# Patient Record
Sex: FEMALE | Race: WHITE | Employment: OTHER | URBAN - METROPOLITAN AREA
[De-identification: names, ages, dates, MRNs, and addresses within clinical notes are randomized per-mention and may not be internally consistent; named-entity substitution may affect disease eponyms.]

---

## 2017-01-01 ENCOUNTER — APPOINTMENT (OUTPATIENT)
Dept: GENERAL RADIOLOGY | Facility: CLINIC | Age: 82
End: 2017-01-01
Attending: EMERGENCY MEDICINE
Payer: MEDICARE

## 2017-01-01 ENCOUNTER — MEDICAL CORRESPONDENCE (OUTPATIENT)
Dept: HEALTH INFORMATION MANAGEMENT | Facility: CLINIC | Age: 82
End: 2017-01-01

## 2017-01-01 ENCOUNTER — HOSPITAL ENCOUNTER (OUTPATIENT)
Facility: CLINIC | Age: 82
Setting detail: OBSERVATION
Discharge: HOME HEALTH | End: 2017-03-17
Attending: EMERGENCY MEDICINE | Admitting: INTERNAL MEDICINE
Payer: MEDICARE

## 2017-01-01 ENCOUNTER — TRANSFERRED RECORDS (OUTPATIENT)
Dept: HEALTH INFORMATION MANAGEMENT | Facility: CLINIC | Age: 82
End: 2017-01-01

## 2017-01-01 ENCOUNTER — TELEPHONE (OUTPATIENT)
Dept: FAMILY MEDICINE | Facility: CLINIC | Age: 82
End: 2017-01-01

## 2017-01-01 ENCOUNTER — DOCUMENTATION ONLY (OUTPATIENT)
Dept: CARE COORDINATION | Facility: CLINIC | Age: 82
End: 2017-01-01

## 2017-01-01 ENCOUNTER — APPOINTMENT (OUTPATIENT)
Dept: PHYSICAL THERAPY | Facility: CLINIC | Age: 82
End: 2017-01-01
Attending: INTERNAL MEDICINE
Payer: MEDICARE

## 2017-01-01 ENCOUNTER — CARE COORDINATION (OUTPATIENT)
Dept: CARE COORDINATION | Facility: CLINIC | Age: 82
End: 2017-01-01

## 2017-01-01 ENCOUNTER — APPOINTMENT (OUTPATIENT)
Dept: CT IMAGING | Facility: CLINIC | Age: 82
End: 2017-01-01
Attending: EMERGENCY MEDICINE
Payer: MEDICARE

## 2017-01-01 ENCOUNTER — HOSPITAL ENCOUNTER (OUTPATIENT)
Facility: CLINIC | Age: 82
Setting detail: OBSERVATION
Discharge: HOME | End: 2017-03-15
Attending: EMERGENCY MEDICINE | Admitting: INTERNAL MEDICINE
Payer: MEDICARE

## 2017-01-01 ENCOUNTER — OFFICE VISIT (OUTPATIENT)
Dept: FAMILY MEDICINE | Facility: CLINIC | Age: 82
End: 2017-01-01
Payer: COMMERCIAL

## 2017-01-01 VITALS
BODY MASS INDEX: 17.85 KG/M2 | DIASTOLIC BLOOD PRESSURE: 69 MMHG | HEIGHT: 62 IN | SYSTOLIC BLOOD PRESSURE: 141 MMHG | TEMPERATURE: 97.9 F | RESPIRATION RATE: 16 BRPM | OXYGEN SATURATION: 94 % | WEIGHT: 97 LBS

## 2017-01-01 VITALS
SYSTOLIC BLOOD PRESSURE: 169 MMHG | OXYGEN SATURATION: 94 % | DIASTOLIC BLOOD PRESSURE: 75 MMHG | HEART RATE: 77 BPM | TEMPERATURE: 97.2 F | RESPIRATION RATE: 16 BRPM

## 2017-01-01 VITALS
BODY MASS INDEX: 17.85 KG/M2 | WEIGHT: 97 LBS | SYSTOLIC BLOOD PRESSURE: 130 MMHG | HEIGHT: 62 IN | TEMPERATURE: 98.1 F | HEART RATE: 114 BPM | OXYGEN SATURATION: 95 % | DIASTOLIC BLOOD PRESSURE: 58 MMHG

## 2017-01-01 DIAGNOSIS — G89.4 CHRONIC PAIN SYNDROME: ICD-10-CM

## 2017-01-01 DIAGNOSIS — F51.01 PRIMARY INSOMNIA: ICD-10-CM
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
## 2017-01-01 DIAGNOSIS — R63.8 POOR FLUID INTAKE: ICD-10-CM

## 2017-01-01 DIAGNOSIS — S32.010A COMPRESSION FRACTURE OF L1 LUMBAR VERTEBRA, CLOSED, INITIAL ENCOUNTER (H): ICD-10-CM

## 2017-01-01 DIAGNOSIS — R11.0 NAUSEA: Primary | ICD-10-CM

## 2017-01-01 DIAGNOSIS — I48.91 ATRIAL FIBRILLATION, UNSPECIFIED TYPE (H): ICD-10-CM

## 2017-01-01 DIAGNOSIS — J44.9 CHRONIC OBSTRUCTIVE PULMONARY DISEASE, UNSPECIFIED COPD TYPE (H): ICD-10-CM

## 2017-01-01 DIAGNOSIS — I48.91 ATRIAL FIBRILLATION WITH RVR (H): ICD-10-CM

## 2017-01-01 DIAGNOSIS — S32.010A COMPRESSION FRACTURE OF L1 LUMBAR VERTEBRA, CLOSED, INITIAL ENCOUNTER (H): Primary | ICD-10-CM

## 2017-01-01 LAB
ALBUMIN UR-MCNC: 30 MG/DL
ALBUMIN UR-MCNC: NEGATIVE MG/DL
ANION GAP SERPL CALCULATED.3IONS-SCNC: 10 MMOL/L (ref 3–14)
ANION GAP SERPL CALCULATED.3IONS-SCNC: 13 MMOL/L (ref 3–14)
APPEARANCE UR: ABNORMAL
APPEARANCE UR: ABNORMAL
BACTERIA #/AREA URNS HPF: ABNORMAL /HPF
BASOPHILS # BLD AUTO: 0.1 10E9/L (ref 0–0.2)
BASOPHILS # BLD AUTO: 0.1 10E9/L (ref 0–0.2)
BASOPHILS NFR BLD AUTO: 0.5 %
BASOPHILS NFR BLD AUTO: 0.7 %
BILIRUB UR QL STRIP: NEGATIVE
BILIRUB UR QL STRIP: NEGATIVE
BUN SERPL-MCNC: 17 MG/DL (ref 7–30)
BUN SERPL-MCNC: 19 MG/DL (ref 7–30)
CALCIUM SERPL-MCNC: 8.6 MG/DL (ref 8.5–10.1)
CALCIUM SERPL-MCNC: 8.9 MG/DL (ref 8.5–10.1)
CHLORIDE SERPL-SCNC: 97 MMOL/L (ref 94–109)
CHLORIDE SERPL-SCNC: 98 MMOL/L (ref 94–109)
CO2 SERPL-SCNC: 25 MMOL/L (ref 20–32)
CO2 SERPL-SCNC: 26 MMOL/L (ref 20–32)
COLOR UR AUTO: YELLOW
COLOR UR AUTO: YELLOW
CREAT SERPL-MCNC: 0.69 MG/DL (ref 0.52–1.04)
CREAT SERPL-MCNC: 0.72 MG/DL (ref 0.52–1.04)
CRP SERPL-MCNC: 27.8 MG/L (ref 0–8)
DIFFERENTIAL METHOD BLD: ABNORMAL
DIFFERENTIAL METHOD BLD: ABNORMAL
EOSINOPHIL # BLD AUTO: 0.2 10E9/L (ref 0–0.7)
EOSINOPHIL # BLD AUTO: 0.2 10E9/L (ref 0–0.7)
EOSINOPHIL NFR BLD AUTO: 1.5 %
EOSINOPHIL NFR BLD AUTO: 1.6 %
ERYTHROCYTE [DISTWIDTH] IN BLOOD BY AUTOMATED COUNT: 12.2 % (ref 10–15)
ERYTHROCYTE [DISTWIDTH] IN BLOOD BY AUTOMATED COUNT: 12.4 % (ref 10–15)
GFR SERPL CREATININE-BSD FRML MDRD: 77 ML/MIN/1.7M2
GFR SERPL CREATININE-BSD FRML MDRD: 81 ML/MIN/1.7M2
GLUCOSE SERPL-MCNC: 104 MG/DL (ref 70–99)
GLUCOSE SERPL-MCNC: 110 MG/DL (ref 70–99)
GLUCOSE UR STRIP-MCNC: NEGATIVE MG/DL
GLUCOSE UR STRIP-MCNC: NEGATIVE MG/DL
HCT VFR BLD AUTO: 38.7 % (ref 35–47)
HCT VFR BLD AUTO: 39.2 % (ref 35–47)
HGB BLD-MCNC: 13.5 G/DL (ref 11.7–15.7)
HGB BLD-MCNC: 13.6 G/DL (ref 11.7–15.7)
HGB UR QL STRIP: NEGATIVE
HGB UR QL STRIP: NEGATIVE
HYALINE CASTS #/AREA URNS LPF: 4 /LPF (ref 0–2)
IMM GRANULOCYTES # BLD: 0.1 10E9/L (ref 0–0.4)
IMM GRANULOCYTES # BLD: 0.1 10E9/L (ref 0–0.4)
IMM GRANULOCYTES NFR BLD: 0.5 %
IMM GRANULOCYTES NFR BLD: 0.6 %
INTERPRETATION ECG - MUSE: NORMAL
KETONES UR STRIP-MCNC: 5 MG/DL
KETONES UR STRIP-MCNC: NEGATIVE MG/DL
LACTATE BLD-SCNC: 0.9 MMOL/L (ref 0.7–2.1)
LEUKOCYTE ESTERASE UR QL STRIP: NEGATIVE
LEUKOCYTE ESTERASE UR QL STRIP: NEGATIVE
LYMPHOCYTES # BLD AUTO: 1.9 10E9/L (ref 0.8–5.3)
LYMPHOCYTES # BLD AUTO: 2.2 10E9/L (ref 0.8–5.3)
LYMPHOCYTES NFR BLD AUTO: 16.1 %
LYMPHOCYTES NFR BLD AUTO: 17.1 %
MCH RBC QN AUTO: 32.2 PG (ref 26.5–33)
MCH RBC QN AUTO: 32.5 PG (ref 26.5–33)
MCHC RBC AUTO-ENTMCNC: 34.7 G/DL (ref 31.5–36.5)
MCHC RBC AUTO-ENTMCNC: 34.9 G/DL (ref 31.5–36.5)
MCV RBC AUTO: 93 FL (ref 78–100)
MCV RBC AUTO: 93 FL (ref 78–100)
MONOCYTES # BLD AUTO: 0.8 10E9/L (ref 0–1.3)
MONOCYTES # BLD AUTO: 1.1 10E9/L (ref 0–1.3)
MONOCYTES NFR BLD AUTO: 6.1 %
MONOCYTES NFR BLD AUTO: 9.2 %
MUCOUS THREADS #/AREA URNS LPF: PRESENT /LPF
MUCOUS THREADS #/AREA URNS LPF: PRESENT /LPF
NEUTROPHILS # BLD AUTO: 8.3 10E9/L (ref 1.6–8.3)
NEUTROPHILS # BLD AUTO: 9.7 10E9/L (ref 1.6–8.3)
NEUTROPHILS NFR BLD AUTO: 71.9 %
NEUTROPHILS NFR BLD AUTO: 74.2 %
NITRATE UR QL: NEGATIVE
NITRATE UR QL: NEGATIVE
NRBC # BLD AUTO: 0 10*3/UL
NRBC # BLD AUTO: 0 10*3/UL
NRBC BLD AUTO-RTO: 0 /100
NRBC BLD AUTO-RTO: 0 /100
PH UR STRIP: 5 PH (ref 5–7)
PH UR STRIP: 5 PH (ref 5–7)
PLATELET # BLD AUTO: 363 10E9/L (ref 150–450)
PLATELET # BLD AUTO: 411 10E9/L (ref 150–450)
POTASSIUM SERPL-SCNC: 3.6 MMOL/L (ref 3.4–5.3)
POTASSIUM SERPL-SCNC: 3.6 MMOL/L (ref 3.4–5.3)
RBC # BLD AUTO: 4.16 10E12/L (ref 3.8–5.2)
RBC # BLD AUTO: 4.22 10E12/L (ref 3.8–5.2)
RBC #/AREA URNS AUTO: 2 /HPF (ref 0–2)
RBC #/AREA URNS AUTO: <1 /HPF (ref 0–2)
SODIUM SERPL-SCNC: 134 MMOL/L (ref 133–144)
SODIUM SERPL-SCNC: 135 MMOL/L (ref 133–144)
SP GR UR STRIP: 1.01 (ref 1–1.03)
SP GR UR STRIP: 1.02 (ref 1–1.03)
SQUAMOUS #/AREA URNS AUTO: 1 /HPF (ref 0–1)
SQUAMOUS #/AREA URNS AUTO: 6 /HPF (ref 0–1)
TROPONIN I SERPL-MCNC: NORMAL UG/L (ref 0–0.04)
URN SPEC COLLECT METH UR: ABNORMAL
URN SPEC COLLECT METH UR: ABNORMAL
UROBILINOGEN UR STRIP-MCNC: 0 MG/DL (ref 0–2)
UROBILINOGEN UR STRIP-MCNC: 0 MG/DL (ref 0–2)
VIT B12 SERPL-MCNC: 1719 PG/ML (ref 193–986)
WBC # BLD AUTO: 11.6 10E9/L (ref 4–11)
WBC # BLD AUTO: 13.1 10E9/L (ref 4–11)
WBC #/AREA URNS AUTO: 1 /HPF (ref 0–2)
WBC #/AREA URNS AUTO: 1 /HPF (ref 0–2)

## 2017-01-01 PROCEDURE — 96374 THER/PROPH/DIAG INJ IV PUSH: CPT

## 2017-01-01 PROCEDURE — 71020 XR CHEST 2 VW: CPT

## 2017-01-01 PROCEDURE — 85025 COMPLETE CBC W/AUTO DIFF WBC: CPT | Performed by: EMERGENCY MEDICINE

## 2017-01-01 PROCEDURE — 81001 URINALYSIS AUTO W/SCOPE: CPT | Performed by: EMERGENCY MEDICINE

## 2017-01-01 PROCEDURE — 99236 HOSP IP/OBS SAME DATE HI 85: CPT | Performed by: INTERNAL MEDICINE

## 2017-01-01 PROCEDURE — 25000132 ZZH RX MED GY IP 250 OP 250 PS 637: Mod: GY | Performed by: INTERNAL MEDICINE

## 2017-01-01 PROCEDURE — 25000132 ZZH RX MED GY IP 250 OP 250 PS 637: Mod: GY | Performed by: EMERGENCY MEDICINE

## 2017-01-01 PROCEDURE — 80048 BASIC METABOLIC PNL TOTAL CA: CPT | Performed by: EMERGENCY MEDICINE

## 2017-01-01 PROCEDURE — A9270 NON-COVERED ITEM OR SERVICE: HCPCS | Mod: GY | Performed by: INTERNAL MEDICINE

## 2017-01-01 PROCEDURE — 84484 ASSAY OF TROPONIN QUANT: CPT | Performed by: EMERGENCY MEDICINE

## 2017-01-01 PROCEDURE — 93005 ELECTROCARDIOGRAM TRACING: CPT

## 2017-01-01 PROCEDURE — 40000193 ZZH STATISTIC PT WARD VISIT: Performed by: PHYSICAL THERAPIST

## 2017-01-01 PROCEDURE — 25000128 H RX IP 250 OP 636: Performed by: INTERNAL MEDICINE

## 2017-01-01 PROCEDURE — 72131 CT LUMBAR SPINE W/O DYE: CPT

## 2017-01-01 PROCEDURE — 99217 ZZC OBSERVATION CARE DISCHARGE: CPT | Performed by: PHYSICIAN ASSISTANT

## 2017-01-01 PROCEDURE — 25000132 ZZH RX MED GY IP 250 OP 250 PS 637: Performed by: PHYSICIAN ASSISTANT

## 2017-01-01 PROCEDURE — G0378 HOSPITAL OBSERVATION PER HR: HCPCS

## 2017-01-01 PROCEDURE — A9270 NON-COVERED ITEM OR SERVICE: HCPCS | Mod: GY | Performed by: EMERGENCY MEDICINE

## 2017-01-01 PROCEDURE — 97161 PT EVAL LOW COMPLEX 20 MIN: CPT | Mod: GP | Performed by: PHYSICAL THERAPIST

## 2017-01-01 PROCEDURE — 83605 ASSAY OF LACTIC ACID: CPT | Performed by: EMERGENCY MEDICINE

## 2017-01-01 PROCEDURE — 86140 C-REACTIVE PROTEIN: CPT | Performed by: EMERGENCY MEDICINE

## 2017-01-01 PROCEDURE — 25000128 H RX IP 250 OP 636: Performed by: EMERGENCY MEDICINE

## 2017-01-01 PROCEDURE — 99207 ZZC APP CREDIT; MD BILLING SHARED VISIT: CPT | Performed by: PHYSICIAN ASSISTANT

## 2017-01-01 PROCEDURE — 99222 1ST HOSP IP/OBS MODERATE 55: CPT | Performed by: NURSE PRACTITIONER

## 2017-01-01 PROCEDURE — 96375 TX/PRO/DX INJ NEW DRUG ADDON: CPT

## 2017-01-01 PROCEDURE — 25500064 ZZH RX 255 OP 636: Performed by: EMERGENCY MEDICINE

## 2017-01-01 PROCEDURE — 74177 CT ABD & PELVIS W/CONTRAST: CPT

## 2017-01-01 PROCEDURE — 99214 OFFICE O/P EST MOD 30 MIN: CPT | Performed by: FAMILY MEDICINE

## 2017-01-01 PROCEDURE — 70450 CT HEAD/BRAIN W/O DYE: CPT

## 2017-01-01 PROCEDURE — 96361 HYDRATE IV INFUSION ADD-ON: CPT

## 2017-01-01 PROCEDURE — 99285 EMERGENCY DEPT VISIT HI MDM: CPT | Mod: 25

## 2017-01-01 PROCEDURE — 36415 COLL VENOUS BLD VENIPUNCTURE: CPT | Performed by: PHYSICIAN ASSISTANT

## 2017-01-01 PROCEDURE — 96376 TX/PRO/DX INJ SAME DRUG ADON: CPT

## 2017-01-01 PROCEDURE — 99220 ZZC INITIAL OBSERVATION CARE,LEVL III: CPT | Performed by: INTERNAL MEDICINE

## 2017-01-01 PROCEDURE — 82607 VITAMIN B-12: CPT | Performed by: PHYSICIAN ASSISTANT

## 2017-01-01 PROCEDURE — G0180 MD CERTIFICATION HHA PATIENT: HCPCS | Mod: GW | Performed by: FAMILY MEDICINE

## 2017-01-01 RX ORDER — DOCUSATE SODIUM 100 MG/1
100 CAPSULE, LIQUID FILLED ORAL 2 TIMES DAILY
Qty: 30 CAPSULE | Refills: 0 | Status: SHIPPED | OUTPATIENT
Start: 2017-01-01

## 2017-01-01 RX ORDER — HYDROCODONE BITARTRATE AND ACETAMINOPHEN 5; 325 MG/1; MG/1
1-2 TABLET ORAL EVERY 6 HOURS PRN
Qty: 30 TABLET | Refills: 0 | Status: SHIPPED | OUTPATIENT
Start: 2017-01-01 | End: 2017-01-01

## 2017-01-01 RX ORDER — TRAMADOL HYDROCHLORIDE 50 MG/1
25 TABLET ORAL EVERY 6 HOURS PRN
Qty: 5 TABLET | Refills: 0 | Status: ON HOLD | OUTPATIENT
Start: 2017-01-01 | End: 2017-01-01

## 2017-01-01 RX ORDER — ASPIRIN 325 MG
325 TABLET ORAL DAILY
COMMUNITY

## 2017-01-01 RX ORDER — ACETAMINOPHEN 500 MG
1000 TABLET ORAL 3 TIMES DAILY
Status: DISCONTINUED | OUTPATIENT
Start: 2017-01-01 | End: 2017-01-01

## 2017-01-01 RX ORDER — NALOXONE HYDROCHLORIDE 0.4 MG/ML
.1-.4 INJECTION, SOLUTION INTRAMUSCULAR; INTRAVENOUS; SUBCUTANEOUS
Status: DISCONTINUED | OUTPATIENT
Start: 2017-01-01 | End: 2017-01-01 | Stop reason: HOSPADM

## 2017-01-01 RX ORDER — LIDOCAINE 40 MG/G
CREAM TOPICAL
Status: DISCONTINUED | OUTPATIENT
Start: 2017-01-01 | End: 2017-01-01

## 2017-01-01 RX ORDER — ACETAMINOPHEN 500 MG
1000 TABLET ORAL 3 TIMES DAILY
Qty: 1 BOTTLE | Refills: 0 | Status: ON HOLD | OUTPATIENT
Start: 2017-01-01 | End: 2017-01-01

## 2017-01-01 RX ORDER — ACETAMINOPHEN 500 MG
1000 TABLET ORAL 3 TIMES DAILY
Status: DISCONTINUED | OUTPATIENT
Start: 2017-01-01 | End: 2017-01-01 | Stop reason: HOSPADM

## 2017-01-01 RX ORDER — SODIUM CHLORIDE 9 MG/ML
1000 INJECTION, SOLUTION INTRAVENOUS CONTINUOUS
Status: DISCONTINUED | OUTPATIENT
Start: 2017-01-01 | End: 2017-01-01

## 2017-01-01 RX ORDER — ACETAMINOPHEN 500 MG
500 TABLET ORAL 3 TIMES DAILY
Status: ON HOLD | COMMUNITY
End: 2017-01-01

## 2017-01-01 RX ORDER — ACETAMINOPHEN 325 MG/1
650 TABLET ORAL ONCE
Status: COMPLETED | OUTPATIENT
Start: 2017-01-01 | End: 2017-01-01

## 2017-01-01 RX ORDER — ACETAMINOPHEN 500 MG
500-1000 TABLET ORAL EVERY 8 HOURS PRN
Status: ON HOLD | COMMUNITY
End: 2017-01-01

## 2017-01-01 RX ORDER — SENNOSIDES 8.6 MG
1 TABLET ORAL 2 TIMES DAILY
Qty: 30 TABLET | Refills: 1 | Status: SHIPPED | OUTPATIENT
Start: 2017-01-01

## 2017-01-01 RX ORDER — ONDANSETRON 4 MG/1
4 TABLET, ORALLY DISINTEGRATING ORAL EVERY 6 HOURS PRN
Qty: 20 TABLET | Refills: 0 | Status: SHIPPED | OUTPATIENT
Start: 2017-01-01

## 2017-01-01 RX ORDER — HYDROMORPHONE HCL/0.9% NACL/PF 0.2MG/0.2
0.2 SYRINGE (ML) INTRAVENOUS ONCE
Status: COMPLETED | OUTPATIENT
Start: 2017-01-01 | End: 2017-01-01

## 2017-01-01 RX ORDER — ALBUTEROL SULFATE 0.83 MG/ML
2.5 SOLUTION RESPIRATORY (INHALATION)
Status: DISCONTINUED | OUTPATIENT
Start: 2017-01-01 | End: 2017-01-01 | Stop reason: HOSPADM

## 2017-01-01 RX ORDER — DILTIAZEM HYDROCHLORIDE 180 MG/1
CAPSULE, EXTENDED RELEASE ORAL
Qty: 90 CAPSULE | Refills: 1 | Status: SHIPPED | OUTPATIENT
Start: 2017-01-01

## 2017-01-01 RX ORDER — PREDNISONE 20 MG/1
20 TABLET ORAL DAILY
Qty: 5 TABLET | Refills: 0 | Status: SHIPPED | OUTPATIENT
Start: 2017-01-01

## 2017-01-01 RX ORDER — MIRTAZAPINE 15 MG/1
15 TABLET, FILM COATED ORAL AT BEDTIME
Status: DISCONTINUED | OUTPATIENT
Start: 2017-01-01 | End: 2017-01-01 | Stop reason: HOSPADM

## 2017-01-01 RX ORDER — HYDROMORPHONE HCL/0.9% NACL/PF 0.2MG/0.2
0.2 SYRINGE (ML) INTRAVENOUS
Status: COMPLETED | OUTPATIENT
Start: 2017-01-01 | End: 2017-01-01

## 2017-01-01 RX ORDER — HYDROMORPHONE HCL/0.9% NACL/PF 0.2MG/0.2
0.2 SYRINGE (ML) INTRAVENOUS
Status: DISCONTINUED | OUTPATIENT
Start: 2017-01-01 | End: 2017-01-01 | Stop reason: CLARIF

## 2017-01-01 RX ORDER — ONDANSETRON 4 MG/1
4 TABLET, ORALLY DISINTEGRATING ORAL EVERY 6 HOURS PRN
Status: DISCONTINUED | OUTPATIENT
Start: 2017-01-01 | End: 2017-01-01 | Stop reason: HOSPADM

## 2017-01-01 RX ORDER — SODIUM CHLORIDE 9 MG/ML
1000 INJECTION, SOLUTION INTRAVENOUS CONTINUOUS
Status: DISCONTINUED | OUTPATIENT
Start: 2017-01-01 | End: 2017-01-01 | Stop reason: CLARIF

## 2017-01-01 RX ORDER — ACETAMINOPHEN 500 MG
500 TABLET ORAL 3 TIMES DAILY
Qty: 1 BOTTLE | Refills: 0 | Status: SHIPPED | OUTPATIENT
Start: 2017-01-01

## 2017-01-01 RX ORDER — DILTIAZEM HYDROCHLORIDE 180 MG/1
180 CAPSULE, COATED, EXTENDED RELEASE ORAL DAILY
Status: DISCONTINUED | OUTPATIENT
Start: 2017-01-01 | End: 2017-01-01 | Stop reason: HOSPADM

## 2017-01-01 RX ORDER — ONDANSETRON 2 MG/ML
4 INJECTION INTRAMUSCULAR; INTRAVENOUS EVERY 30 MIN PRN
Status: DISCONTINUED | OUTPATIENT
Start: 2017-01-01 | End: 2017-01-01

## 2017-01-01 RX ORDER — HYDROMORPHONE HYDROCHLORIDE 2 MG/1
2 TABLET ORAL EVERY 6 HOURS PRN
Qty: 20 TABLET | Refills: 0 | Status: ON HOLD | OUTPATIENT
Start: 2017-01-01 | End: 2017-01-01

## 2017-01-01 RX ORDER — DOCUSATE SODIUM 100 MG/1
100 CAPSULE, LIQUID FILLED ORAL 2 TIMES DAILY
Status: DISCONTINUED | OUTPATIENT
Start: 2017-01-01 | End: 2017-01-01 | Stop reason: HOSPADM

## 2017-01-01 RX ORDER — ALBUTEROL SULFATE 0.83 MG/ML
1 SOLUTION RESPIRATORY (INHALATION) EVERY 6 HOURS PRN
Status: DISCONTINUED | OUTPATIENT
Start: 2017-01-01 | End: 2017-01-01 | Stop reason: HOSPADM

## 2017-01-01 RX ORDER — OXYCODONE HYDROCHLORIDE 5 MG/1
2.5 TABLET ORAL EVERY 4 HOURS PRN
Qty: 20 TABLET | Refills: 0 | Status: SHIPPED | OUTPATIENT
Start: 2017-01-01

## 2017-01-01 RX ORDER — HYDROMORPHONE HYDROCHLORIDE 2 MG/1
2 TABLET ORAL EVERY 4 HOURS PRN
Status: DISCONTINUED | OUTPATIENT
Start: 2017-01-01 | End: 2017-01-01

## 2017-01-01 RX ORDER — DONEPEZIL HYDROCHLORIDE 5 MG/1
5 TABLET, FILM COATED ORAL AT BEDTIME
Status: DISCONTINUED | OUTPATIENT
Start: 2017-01-01 | End: 2017-01-01 | Stop reason: HOSPADM

## 2017-01-01 RX ORDER — ONDANSETRON 4 MG/1
4 TABLET, ORALLY DISINTEGRATING ORAL EVERY 6 HOURS PRN
Qty: 10 TABLET | Refills: 0 | Status: SHIPPED | OUTPATIENT
Start: 2017-01-01 | End: 2017-01-01

## 2017-01-01 RX ORDER — HYDROMORPHONE HCL/0.9% NACL/PF 0.2MG/0.2
0.2 SYRINGE (ML) INTRAVENOUS
Status: DISCONTINUED | OUTPATIENT
Start: 2017-01-01 | End: 2017-01-01

## 2017-01-01 RX ORDER — IOPAMIDOL 755 MG/ML
500 INJECTION, SOLUTION INTRAVASCULAR ONCE
Status: COMPLETED | OUTPATIENT
Start: 2017-01-01 | End: 2017-01-01

## 2017-01-01 RX ORDER — ACETAMINOPHEN 500 MG
500 TABLET ORAL 3 TIMES DAILY
Status: DISCONTINUED | OUTPATIENT
Start: 2017-01-01 | End: 2017-01-01 | Stop reason: HOSPADM

## 2017-01-01 RX ORDER — OXYCODONE HYDROCHLORIDE 5 MG/1
5 TABLET ORAL EVERY 4 HOURS PRN
Qty: 60 TABLET | Refills: 0 | Status: SHIPPED | OUTPATIENT
Start: 2017-01-01

## 2017-01-01 RX ORDER — HYDROCODONE BITARTRATE AND ACETAMINOPHEN 5; 325 MG/1; MG/1
1-2 TABLET ORAL EVERY 4 HOURS PRN
Status: DISCONTINUED | OUTPATIENT
Start: 2017-01-01 | End: 2017-01-01 | Stop reason: HOSPADM

## 2017-01-01 RX ORDER — ONDANSETRON 2 MG/ML
4 INJECTION INTRAMUSCULAR; INTRAVENOUS EVERY 6 HOURS PRN
Status: DISCONTINUED | OUTPATIENT
Start: 2017-01-01 | End: 2017-01-01 | Stop reason: HOSPADM

## 2017-01-01 RX ORDER — OXYCODONE HCL 10 MG/1
10 TABLET, FILM COATED, EXTENDED RELEASE ORAL EVERY 12 HOURS
Qty: 60 TABLET | Refills: 0 | Status: SHIPPED | OUTPATIENT
Start: 2017-01-01

## 2017-01-01 RX ORDER — TRAZODONE HYDROCHLORIDE 50 MG/1
50 TABLET, FILM COATED ORAL AT BEDTIME
Qty: 90 TABLET | Refills: 0 | Status: SHIPPED | OUTPATIENT
Start: 2017-01-01

## 2017-01-01 RX ORDER — MIRTAZAPINE 15 MG/1
TABLET, FILM COATED ORAL
Qty: 90 TABLET | Refills: 1 | Status: SHIPPED | OUTPATIENT
Start: 2017-01-01

## 2017-01-01 RX ORDER — MIRTAZAPINE 15 MG/1
15 TABLET, FILM COATED ORAL AT BEDTIME
Qty: 90 TABLET | Refills: 1 | Status: SHIPPED | OUTPATIENT
Start: 2017-01-01 | End: 2017-01-01

## 2017-01-01 RX ORDER — ACETAMINOPHEN 325 MG/1
650 TABLET ORAL EVERY 4 HOURS PRN
Status: DISCONTINUED | OUTPATIENT
Start: 2017-01-01 | End: 2017-01-01 | Stop reason: HOSPADM

## 2017-01-01 RX ORDER — HYDROCODONE BITARTRATE AND ACETAMINOPHEN 5; 325 MG/1; MG/1
1-2 TABLET ORAL EVERY 6 HOURS PRN
Qty: 30 TABLET | Refills: 0 | Status: SHIPPED | OUTPATIENT
Start: 2017-01-01

## 2017-01-01 RX ADMIN — HYDROMORPHONE HYDROCHLORIDE 0.2 MG: 10 INJECTION, SOLUTION INTRAMUSCULAR; INTRAVENOUS; SUBCUTANEOUS at 03:17

## 2017-01-01 RX ADMIN — HYDROCODONE BITARTRATE AND ACETAMINOPHEN 1 TABLET: 5; 325 TABLET ORAL at 03:54

## 2017-01-01 RX ADMIN — HYDROMORPHONE HYDROCHLORIDE 0.2 MG: 10 INJECTION, SOLUTION INTRAMUSCULAR; INTRAVENOUS; SUBCUTANEOUS at 00:25

## 2017-01-01 RX ADMIN — DOCUSATE SODIUM 100 MG: 100 CAPSULE, LIQUID FILLED ORAL at 08:19

## 2017-01-01 RX ADMIN — TRAMADOL HYDROCHLORIDE 25 MG: 50 TABLET ORAL at 13:41

## 2017-01-01 RX ADMIN — ACETAMINOPHEN 650 MG: 325 TABLET, FILM COATED ORAL at 23:14

## 2017-01-01 RX ADMIN — HYDROMORPHONE HYDROCHLORIDE 0.2 MG: 10 INJECTION, SOLUTION INTRAMUSCULAR; INTRAVENOUS; SUBCUTANEOUS at 06:06

## 2017-01-01 RX ADMIN — HYDROMORPHONE HYDROCHLORIDE 0.2 MG: 10 INJECTION, SOLUTION INTRAMUSCULAR; INTRAVENOUS; SUBCUTANEOUS at 20:59

## 2017-01-01 RX ADMIN — ASPIRIN 325 MG: 325 TABLET, DELAYED RELEASE ORAL at 12:48

## 2017-01-01 RX ADMIN — DONEPEZIL HYDROCHLORIDE 5 MG: 5 TABLET ORAL at 01:35

## 2017-01-01 RX ADMIN — SODIUM CHLORIDE 1000 ML: 9 INJECTION, SOLUTION INTRAVENOUS at 23:14

## 2017-01-01 RX ADMIN — MENTHOL 1 PATCH: 205.5 PATCH TOPICAL at 03:41

## 2017-01-01 RX ADMIN — ACETAMINOPHEN 1000 MG: 500 TABLET, FILM COATED ORAL at 08:09

## 2017-01-01 RX ADMIN — ONDANSETRON 4 MG: 2 INJECTION INTRAMUSCULAR; INTRAVENOUS at 20:59

## 2017-01-01 RX ADMIN — SODIUM CHLORIDE 52 ML: 9 INJECTION, SOLUTION INTRAVENOUS at 00:12

## 2017-01-01 RX ADMIN — IOPAMIDOL 50 ML: 755 INJECTION, SOLUTION INTRAVENOUS at 00:10

## 2017-01-01 RX ADMIN — DOCUSATE SODIUM 100 MG: 100 CAPSULE, LIQUID FILLED ORAL at 08:09

## 2017-01-01 RX ADMIN — DILTIAZEM HYDROCHLORIDE 180 MG: 180 CAPSULE, EXTENDED RELEASE ORAL at 12:48

## 2017-01-01 RX ADMIN — SODIUM CHLORIDE 1000 ML: 9 INJECTION, SOLUTION INTRAVENOUS at 20:59

## 2017-01-01 RX ADMIN — DILTIAZEM HYDROCHLORIDE 180 MG: 180 CAPSULE, EXTENDED RELEASE ORAL at 08:09

## 2017-01-01 RX ADMIN — ACETAMINOPHEN 1000 MG: 500 TABLET, FILM COATED ORAL at 08:19

## 2017-01-01 ASSESSMENT — PAIN DESCRIPTION - DESCRIPTORS
DESCRIPTORS: ACHING
DESCRIPTORS: ACHING;SHARP
DESCRIPTORS: ACHING

## 2017-01-01 ASSESSMENT — ENCOUNTER SYMPTOMS
ABDOMINAL PAIN: 1
ABDOMINAL PAIN: 1
FEVER: 0
BACK PAIN: 1
HEADACHES: 0
BACK PAIN: 1
WOUND: 1
FATIGUE: 1
CONSTIPATION: 0
FEVER: 0
NAUSEA: 0
DIARRHEA: 0
VOMITING: 1

## 2017-03-02 NOTE — TELEPHONE ENCOUNTER
Caller:  Matti  Relationship:  Son  Refill request received via: Analia  Patient requesting refill for:   Last office Visit: 05/14/2015  Last Refill: 12/23/2015    HYDROmorphone (DILAUDID) 2 MG tablet   2 mg, EVERY 6 HOURS PRN 0 ordered  EditCancel Reorder     Summary: Take 1 tablet (2 mg) by mouth every 6 hours as needed for pain, Disp-20 tablet, R-0, Local Print   Dose, Route, Frequency: 2 mg, Oral, EVERY 6 HOURS PRN  Start: 12/20/2016  Ord/Sold: 12/20/2016 (O)  Report  Taking:   Long-term:   Pharmacy: [id number] - [place], [place] - [address]  Med Dose History       Patient Sig: Take 1 tablet (2 mg) by mouth every 6 hours as needed for pain       Ordered on: 12/20/2016       Authorized by: WEILER, KAREN       Dispense: 20 tablet            Rubi Loyola  Patient Representative - [facility]

## 2017-03-02 NOTE — TELEPHONE ENCOUNTER
Controlled Substance Refill Request for Dilaudid  Problem List Complete:  No     PROVIDER TO CONSIDER COMPLETION OF PROBLEM LIST AND OVERVIEW/CONTROLLED SUBSTANCE AGREEMENT    Last Written Prescription Date:  12/20/16  Last Fill Quantity: 20,   # refills: 0    Last Office Visit with [facility] primary care provider: 9/22/16    Future Office visit:     Controlled substance agreement on file: No.     Processing:  Patient will  in clinic  RX [place] ([place]) reviewed:  reviewed- no concerns    [place]:  [email or web address]  Ginger Jackson RN, BSN  [facility]

## 2017-03-02 NOTE — TELEPHONE ENCOUNTER
Prescriptions done.  Please notify patient ready for . Placed in Keasha's box.      Karen Weiler, MD

## 2017-03-14 NOTE — IP AVS SNAPSHOT
[facility] Observation Department    [address]    [facility] [address]    Phone:  [phone]                                       After Visit Summary   3/14/2017    Analia Higgins    MRN: [id number]           After Visit Summary Signature Page     I have received my discharge instructions, and my questions have been answered. I have discussed any challenges I see with this plan with the nurse or doctor.    ..........................................................................................................................................  Patient/Patient Representative Signature      ..........................................................................................................................................  Patient Representative Print Name and Relationship to Patient    ..................................................               ................................................  Date                                            Time    ..........................................................................................................................................  Reviewed by Signature/Title    ...................................................              ..............................................  Date                                                            Time

## 2017-03-14 NOTE — IP AVS SNAPSHOT
MRN:[id number]                      After Visit Summary   3/14/2017    Analia Higgins    MRN: [id number]           Thank you!     Thank you for choosing [facility] for your care. Our goal is always to provide you with excellent care. Hearing back from our patients is one way we can continue to improve our services. Please take a few minutes to complete the written survey that you may receive in the mail after you visit. If you would like to speak to someone directly about your visit please contact Patient Relations at [phone]. Thank you!          Patient Information     Date Of Birth          5/26/1929        About your hospital stay     You were admitted on:  March 15, 2017 You last received care in the:  [facility] Observation Department    You were discharged on:  March 15, 2017        Reason for your hospital stay       You were admitted for concerns of fall. We scanned your head and everything looks good except a mild compression fracture of L1. You pain has improved with scheduled tylenol which you should take 3 times a day. Take a pain pill as needed prior to activity but DO NOT MIX with alcohol. Use your walker at all times.                  Who to Call     For medical emergencies, please call 911.  For non-urgent questions about your medical care, please call your primary care provider or clinic, [phone]          Attending Provider     Provider Specialty    Lb Torrez MD Emergency Medicine    [facility], MD [facility]       Primary Care Provider Office Phone # Fax #    Karen Weiler, [phone]       [facility] [address] [facility] [address]        After Care Instructions     Activity       Your activity upon discharge: activity as tolerated, use a walker at all times            Diet       Follow this diet upon discharge: Orders Placed This Encounter      Regular Diet Adult                   Follow-up Appointments     Follow-up and recommended labs and tests        Follow up with primary care provider, Karen Weiler, within 7 days for hospital follow- up.  No follow up labs or test are needed.                  Additional Services     Home care nursing referral       RN skilled nursing visit. RN to assess vital signs and weight, pain level and activity tolerance and home safety.    Your provider has ordered home care nursing services. If you have not been contacted within 2 days of your discharge please call the inpatient department phone number at [phone] .                             Pending Results     No orders found for last 3 day(s).            Statement of Approval     Ordered          03/15/17 1344  I have reviewed and agree with all the recommendations and orders detailed in this document.  EFFECTIVE NOW     Approved and electronically signed by:  Valery Braswell PA-C             Admission Information     Date & Time Department Dept. Phone    3/14/2017 [facility] Observation Department [phone]      Your Vitals Were     Blood Pressure Pulse Temperature Respirations Pulse Oximetry       169/75 (BP Location: Right arm) 77 97.2  F (36.2  C) (Oral) 16 94%       MyChart Information     [employer] [facility] [employer] gives you secure access to your electronic health record. If you see a primary care provider, you can also send messages to your care team and make appointments. If you have questions, please call your primary care clinic.  If you do not have a primary care provider, please call [phone] and they will assist you.        Care EveryWhere ID     This is your Care EveryWhere ID. This could be used by other organizations to access your [place] medical records  [id number]           Review of your medicines      START taking        Dose / Directions    docusate sodium 100 MG capsule   Commonly known as:  COLACE        Dose:  100 mg   Take 1 capsule (100 mg) by mouth 2 times daily    Quantity:  30 capsule   Refills:  0       menthol 5 % Pads   Commonly known as:  ICY HOT        Dose:  1 patch   Apply 1 patch topically every 8 hours as needed for muscle soreness   Quantity:  5 patch   Refills:  0       traMADol 50 MG tablet   Commonly known as:  ULTRAM        Dose:  25 mg   Take 0.5 tablets (25 mg) by mouth every 6 hours as needed for moderate to severe pain   Quantity:  5 tablet   Refills:  0         CONTINUE these medicines which may have CHANGED, or have new prescriptions. If we are uncertain of the size of tablets/capsules you have at home, strength may be listed as something that might have changed.        Dose / Directions    acetaminophen 500 MG tablet   Commonly known as:  TYLENOL   This may have changed:    - how much to take  - Another medication with the same name was removed. Continue taking this medication, and follow the directions you see here.        Dose:  1000 mg   Take 2 tablets (1,000 mg) by mouth 3 times daily   Quantity:  1 Bottle   Refills:  0         CONTINUE these medicines which have NOT CHANGED        Dose / Directions    albuterol (2.5 MG/3ML) 0.083% neb solution   Used for:  COPD (chronic obstructive pulmonary disease) (H)        Dose:  1 vial   Take 1 vial (2.5 mg) by nebulization every 6 hours as needed for shortness of breath / dyspnea or wheezing   Quantity:  75 mL   Refills:  0       aspirin 325 MG tablet        Dose:  325 mg   Take 325 mg by mouth daily   Refills:  0       CENTROVITE Tabs   Used for:  Alcohol abuse        Dose:  1 tablet   Take 1 tablet by mouth daily   Quantity:  90 tablet   Refills:  1       cyanocobalamin 1000 MCG/ML injection   Commonly known as:  VITAMIN B12        Dose:  1 mL   Inject 1 mL into the muscle every 30 days   Refills:  0       diltiazem 180 MG 24 hr capsule   Used for:  Atrial fibrillation with RVR (H)        Dose:  180 mg   Take 1 capsule (180 mg) by mouth daily Must be seen for further refills   Quantity:  90 capsule    Refills:  2       donepezil 5 MG tablet   Commonly known as:  ARICEPT   Used for:  Dementia        Dose:  5 mg   Take 1 tablet (5 mg) by mouth At Bedtime   Quantity:  90 tablet   Refills:  2       ENSURE Liqd   Used for:  Poor nutrition        2 cans of Ensure daily   Quantity:  60 Can   Refills:  11       ipratropium - albuterol 0.5 mg/2.5 mg/3 mL 0.5-2.5 (3) MG/3ML neb solution   Commonly known as:  DUONEB   Used for:  Chronic obstructive pulmonary disease, unspecified COPD type (H)        Dose:  3 mL   Take 1 vial (3 mLs) by nebulization 4 times daily as needed   Quantity:  1 Box   Refills:  4       mirtazapine 15 MG tablet   Commonly known as:  REMERON   Used for:  Primary insomnia        Dose:  15 mg   Take 1 tablet (15 mg) by mouth At Bedtime   Quantity:  90 tablet   Refills:  1       order for DME   Used for:  Fracture of humerus, proximal, right, open, Fall        Refills:  0       order for DME   Used for:  Chronic obstructive pulmonary disease, unspecified COPD type (H)        Equipment being ordered: Nebulizer   Quantity:  1 Device   Refills:  0       traZODone 50 MG tablet   Commonly known as:  DESYREL   Used for:  Chronic pain syndrome        Dose:  50 mg   Take 1 tablet (50 mg) by mouth At Bedtime   Quantity:  90 tablet   Refills:  0       VITAMIN D (CHOLECALCIFEROL) PO        Dose:  2000 Units   Take 2,000 Units by mouth daily.   Refills:  0         STOP taking     HYDROmorphone 2 MG tablet   Commonly known as:  DILAUDID                Where to get your medicines      These medications were sent to [facility] [address] [address] [address]     Phone:  [phone]     acetaminophen 500 MG tablet    docusate sodium 100 MG capsule    menthol 5 % Pads         Some of these will need a paper prescription and others can be bought over the counter. Ask your nurse if you have questions.     Bring a paper prescription for each of  these medications     traMADol 50 MG tablet                Protect others around you: Learn how to safely use, store and throw away your medicines at www.disposemymeds.org.             Medication List: This is a list of all your medications and when to take them. Check marks below indicate your daily home schedule. Keep this list as a reference.      Medications           Morning Afternoon Evening Bedtime As Needed    acetaminophen 500 MG tablet   Commonly known as:  TYLENOL   Take 2 tablets (1,000 mg) by mouth 3 times daily   Last time this was given:  1,000 mg on 3/15/2017  8:19 AM                                albuterol (2.5 MG/3ML) 0.083% neb solution   Take 1 vial (2.5 mg) by nebulization every 6 hours as needed for shortness of breath / dyspnea or wheezing                                aspirin 325 MG tablet   Take 325 mg by mouth daily                                CENTROVITE Tabs   Take 1 tablet by mouth daily                                cyanocobalamin 1000 MCG/ML injection   Commonly known as:  VITAMIN B12   Inject 1 mL into the muscle every 30 days                                diltiazem 180 MG 24 hr capsule   Take 1 capsule (180 mg) by mouth daily Must be seen for further refills   Last time this was given:  180 mg on 3/15/2017 12:48 PM                                docusate sodium 100 MG capsule   Commonly known as:  COLACE   Take 1 capsule (100 mg) by mouth 2 times daily   Last time this was given:  100 mg on 3/15/2017  8:19 AM                                donepezil 5 MG tablet   Commonly known as:  ARICEPT   Take 1 tablet (5 mg) by mouth At Bedtime                                ENSURE Liqd   2 cans of Ensure daily                                ipratropium - albuterol 0.5 mg/2.5 mg/3 mL 0.5-2.5 (3) MG/3ML neb solution   Commonly known as:  DUONEB   Take 1 vial (3 mLs) by nebulization 4 times daily as needed                                menthol 5 % Pads   Commonly known as:  ICY HOT   Apply  1 patch topically every 8 hours as needed for muscle soreness   Last time this was given:  1 patch on 3/15/2017  3:41 AM                                mirtazapine 15 MG tablet   Commonly known as:  REMERON   Take 1 tablet (15 mg) by mouth At Bedtime                                order for DME                                order for DME   Equipment being ordered: Nebulizer                                traMADol 50 MG tablet   Commonly known as:  ULTRAM   Take 0.5 tablets (25 mg) by mouth every 6 hours as needed for moderate to severe pain   Last time this was given:  25 mg on 3/15/2017  1:41 PM                                traZODone 50 MG tablet   Commonly known as:  DESYREL   Take 1 tablet (50 mg) by mouth At Bedtime                                VITAMIN D (CHOLECALCIFEROL) PO   Take 2,000 Units by mouth daily.

## 2017-03-15 PROBLEM — S32.010A COMPRESSION FRACTURE OF L1 LUMBAR VERTEBRA (H): Status: RESOLVED | Noted: 2017-01-01 | Resolved: 2017-01-01
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
## 2017-03-15 PROBLEM — S32.010A COMPRESSION FRACTURE OF L1 LUMBAR VERTEBRA (H): Status: ACTIVE | Noted: 2017-01-01

## 2017-03-15 PROBLEM — S32.010A COMPRESSION FRACTURE OF L1 LUMBAR VERTEBRA, CLOSED, INITIAL ENCOUNTER (H): Status: ACTIVE | Noted: 2017-01-01

## 2017-03-15 NOTE — PLAN OF CARE
[facility] has been notified of her new services that will be orders at discharge.    Home care RN to assess for PT and OT needs    Liz Adams RN BSN CTS  [facility]   Care Management Coordinator  [email or web address]   [phone]

## 2017-03-15 NOTE — PLAN OF CARE
Problem: Discharge Planning  Goal: Discharge Planning (Adult, OB, Behavioral, Peds)  Outcome: No Change  Patients bp elevated 179/84. Hospitalist notified. No new orders at this time.

## 2017-03-15 NOTE — PLAN OF CARE
Problem: Discharge Planning  Goal: Discharge Planning (Adult, OB, Behavioral, Peds)  Outcome: No Change  ROOM # 220     Living Situation (if not independent, order SW consult): lives with Son (POA)  Facility name:     Activity level at baseline: ind with walker  Activity level on admit:a-2 walker     Is patient a falls risk? Yes  Reason for falls risk:  Mobility  Falls armband on? YES,   Within Arm's Reach? Yes   Bed alarm turned on?   YES,   Personal alarm in place and turned on?   No, Not applicable     Patient registered to observation; given Patient Bill of Rights; given the opportunity to ask questions about observation status and their plan of care.  Patient has been oriented to the observation room, bathroom and call light is in place.     : Son

## 2017-03-15 NOTE — ED NOTES
Bed: ED13  Expected date: 3/14/17  Expected time: 9:16 PM  Means of arrival: Ambulance  Comments:  Lenny Carvalho

## 2017-03-15 NOTE — CONSULTS
[facility]    Neurosurgery Consultation     Date of Admission:  3/14/2017  Date of Consult (When I saw the patient): 03/15/17    Assessment & Plan   Analia Higgins is a 87 year old female who was admitted on 3/14/2017. I was asked to see the patient for L1 vertebral fracture post fall.  Pt is sitting up bed.  She denies any pain and is able to move without difficulty.  The pt was educated regarding her fracture.  She denies any pain and reports that she does not need a brace. The use of a brace for comfort and healing was explained.  However, she is up walking with the walker without difficulty. She can follow up as needed or if pain increases.  .    Active Problems:    Fall, initial encounter    Assessment: stable    Plan: no nsg intervention    FU as needed at [facility].       Compression fracture of L1 lumbar vertebra, closed, initial encounter (H)    Assessment: stable    Plan: as above      I have discussed the following assessment and plan with Dr. Cleveland Zhou  who is in agreement with initial plan and will follow up with further consultation recommendations.    Sadia Fernández [facility]  [facility]  [place] [address]    Tel [phone]  Pager [phone]        Code Status    Prior    Reason for Consult   Reason for consult: I was asked by Dr. Ha to evaluate this patient for L1 fracture.    Primary Care Physician   Karen Weiler    Chief Complaint   none    History is obtained from the patient    History of Present Illness   Analia Higgins is a 87 year old female who presents with L1 fracture post fall    Past Medical History   I have reviewed this patient's medical history and updated it with pertinent information if needed.   Past Medical History   Diagnosis Date     Acute alcoholic hepatitis      elevated LFTs due to ETOH ( -160's in past)     Alcoholic cirrhosis of liver (H) 7/04      early cirrhosis of liver; liver biopsy done during lap tomas     Closed fracture of unspecified part of humerus 2012     Arm fracture - 8 screws     CVA (cerebral infarction) 2012     CVA (cerebral vascular accident) (H)      Kidney stone      Normal delivery      normal vaginal deliveries x 5, no complications with any of the pregnancies or deliveries     Other abnormal blood chemistry      Other and unspecified alcohol dependence, continuous drinking behavior      Pneumonia, organism unspecified      Sciatica      Right sided     Tobacco use disorder      Unspecified disorder of lipoid metabolism        Past Surgical History   I have reviewed this patient's surgical history and updated it with pertinent information if needed.  Past Surgical History   Procedure Laterality Date     C vaginal hysterectomy       Hysterectomy, Vaginal, with cystocele repair     Hc lap,cholecystectomy/graph       Cataract iol, rt/lt       Bilateral - Dr Ronquillo     Open reduction internal fixation acetabulum  2011     left hip     Open reduction internal fixation humerus proximal  2012     Procedure:OPEN REDUCTION INTERNAL FIXATION HUMERUS PROXIMAL; OPEN REDUCTION INTERNAL FIXATION HUMERUS PROXIMAL ; Surgeon:JEN MCCRAY; Location:[facility] OR     Arthroplasty hip  2012     Procedure: ARTHROPLASTY HIP;  RIGHT HIP BIPOLAR (BRENT);  Surgeon: Alessio Finley MD;  Location:  OR       Prior to Admission Medications   Prior to Admission Medications   Prescriptions Last Dose Informant Patient Reported? Taking?   HYDROmorphone (DILAUDID) 2 MG tablet Past Week at Unknown time  No Yes   Sig: Take 1 tablet (2 mg) by mouth every 6 hours as needed for pain   Multiple Vitamins-Minerals (CENTROVITE) TABS 3/14/2017 at Unknown time  No Yes   Sig: Take 1 tablet by mouth daily   Nutritional Supplements (ENSURE) LIQD   No No   Si cans of Ensure daily   ORDER FOR DME   No No   VITAMIN D, CHOLECALCIFEROL, PO  3/14/2017 at Unknown time Son Yes Yes   Sig: Take 2,000 Units by mouth daily.     acetaminophen (TYLENOL) 500 MG tablet 3/14/2017 at Unknown time  Yes Yes   Sig: Take 500 mg by mouth 3 times daily   acetaminophen (TYLENOL) 500 MG tablet Past Month at Unknown time  Yes Yes   Sig: Take 500-1,000 mg by mouth every 8 hours as needed for mild pain or fever   albuterol (2.5 MG/3ML) 0.083% nebulizer solution Past Month at Unknown time  No Yes   Sig: Take 1 vial (2.5 mg) by nebulization every 6 hours as needed for shortness of breath / dyspnea or wheezing   aspirin 325 MG tablet 3/14/2017 at Unknown time  Yes Yes   Sig: Take 325 mg by mouth daily   cyanocobalamin (VITAMIN B12) 1000 MCG/ML injection More than a month at Unknown time  Yes No   Sig: Inject 1 mL into the muscle every 30 days   diltiazem 180 MG 24 hr CD capsule 3/14/2017 at Unknown time  No Yes   Sig: Take 1 capsule (180 mg) by mouth daily Must be seen for further refills   donepezil (ARICEPT) 5 MG tablet 3/14/2017 at Unknown time  No Yes   Sig: Take 1 tablet (5 mg) by mouth At Bedtime   ipratropium - albuterol 0.5 mg/2.5 mg/3 mL (DUONEB) 0.5-2.5 (3) MG/3ML nebulization Past Month at Unknown time  No Yes   Sig: Take 1 vial (3 mLs) by nebulization 4 times daily as needed   mirtazapine (REMERON) 15 MG tablet 3/14/2017 at Unknown time  No Yes   Sig: Take 1 tablet (15 mg) by mouth At Bedtime   order for DME   No No   Sig: Equipment being ordered: Nebulizer   traZODone (DESYREL) 50 MG tablet 3/14/2017 at Unknown time  No Yes   Sig: Take 1 tablet (50 mg) by mouth At Bedtime      Facility-Administered Medications: None     Allergies   Allergies   Allergen Reactions     Codeine      vomiting     Peanuts [Nuts]        Social History   I have reviewed this patient's social history and updated it with pertinent information if needed. Analia TORRES Ronaldo  reports that she has been smoking Cigarettes.  She has a 50.00 pack-year smoking history. She has never used smokeless  tobacco. She reports that she drinks about 21.0 oz of alcohol per week  She reports that she does not use illicit drugs.    Family History   I have reviewed this patient's family history and updated it with pertinent information if needed.   Family History   Problem Relation Age of Onset     C.A.D. Father      late 60's     Breast Cancer Sister      Asthma Son      DIABETES Maternal Aunt      DIABETES Maternal Aunt        Review of Systems   C: NEGATIVE for fever, chills, change in weight  I: NEGATIVE for worrisome rashes, moles or lesions  E: NEGATIVE for vision changes or irritation  E/M: NEGATIVE for ear, mouth and throat problems  R: NEGATIVE for significant cough or SOB  B: NEGATIVE for masses, tenderness or discharge  CV: NEGATIVE for chest pain, palpitations or peripheral edema  GI: NEGATIVE for nausea, abdominal pain, heartburn, or change in bowel habits  : NEGATIVE for frequency, dysuria, or hematuria  M: NEGATIVE for significant arthralgias or myalgia  N: post fall  E: NEGATIVE for temperature intolerance, skin/hair changes  H: NEGATIVE for bleeding problems  P: NEGATIVE for changes in mood or affect    Physical Exam   Temp: 97.2  F (36.2  C) Temp src: Oral BP: 187/88 Pulse: 87 Heart Rate: 96 Resp: 16 SpO2: 94 % O2 Device: None (Room air)    Vital Signs with Ranges  Temp:  [97.2  F (36.2  C)-98.3  F (36.8  C)] 97.2  F (36.2  C)  Pulse:  [83-87] 87  Heart Rate:  [73-96] 96  Resp:  [16] 16  BP: (157-187)/(74-88) 187/88  SpO2:  [91 %-97 %] 94 %  0 lbs 0 oz    Heart Rate: 96, Blood pressure 187/88, pulse 87, temperature 97.2  F (36.2  C), temperature source Oral, resp. rate 16, SpO2 94 %, not currently breastfeeding.  0 lbs 0 oz  HEENT:  Normocephalic, atraumatic.  PERRLA.  EOM s intact.   Neck:  Supple, non-tender, without lymphadenopathy.  Heart:  No peripheral edema  Lungs:  No SOB  Abdomen:  Soft, non-tender, non-distended.  Normal bowel sounds.  Skin:  Warm and dry, good capillary refill.  Extremities:   Good radial and dorsalis pedis pulses bilaterally, no edema, cyanosis or clubbing.    NEUROLOGICAL EXAMINATION:     Mental status:  Alert and Oriented x 3, speech is fluent.  Cranial nerves:  II-XII intact.   Motor:  Strength is 5/5 throughout the upper and lower extremities  Shoulder Abduction:  Right:  5/5   Left:  5/5  Biceps:                      Right:  5/5   Left:  5/5  Triceps:                     Right:  5/5   Left:  5/5  Wrist Extensors:       Right:  5/5   Left:  5/5  Wrist Flexors:           Right:  5/5   Left:  5/5  interosseus :            Right:  5/5   Left:  5/5   Hip Flexor:                Right: 5/5  Left:  5/5  Hip Adductor:             Right:  5/5  Left:  5/5  Hip Abductor:             Right:  5/5  Left:  5/5  Gastroc Soleus:        Right:  5/5  Left:  5/5  Tib/Ant:                      Right:  5/5  Left:  5/5  EHL:                     Right:  5/5  Left:  5/5  Sensation:  intact  Reflexes:   Negative Babinski.  Negative Clonus.      Gait:  Deferred    Lumbar examination reveals no tenderness of the spine or paraspinous muscles.  Hip height is symmetrical. Negative SI joint, sciatic notch or greater trochanteric tenderness to palpation bilaterally.  Straight leg raise is negative bilaterally.       Data   All new lab and imaging data was personally reviewed by me.  CT:IMPRESSION:   1. Acute mild compression fracture of the superior endplate of the L1  vertebral body.  2. No other visualized acute fractures of the lumbar spine.  3. Straightening of the normal lumbar lordosis, otherwise normal  alignment of the lumbar spine.  4. Moderate to severe degenerative changes throughout the lumbar  spine.  5. Partial visualization of a 3.5 cm abdominal aortic aneurysm.  6. A few nonobstructing bilateral renal calculi.      CBC RESULTS:   Recent Labs   Lab Test  03/14/17   2200   WBC  13.1*   RBC  4.16   HGB  13.5   HCT  38.7   MCV  93   MCH  32.5   MCHC  34.9   RDW  12.2   PLT  411     Basic Metabolic  Panel:  Lab Results   Component Value Date     03/14/2017      Lab Results   Component Value Date    POTASSIUM 3.6 03/14/2017     Lab Results   Component Value Date    CHLORIDE 97 03/14/2017     Lab Results   Component Value Date    STEPHANIE 8.6 03/14/2017     Lab Results   Component Value Date    CO2 25 03/14/2017     Lab Results   Component Value Date    BUN 19 03/14/2017     Lab Results   Component Value Date    CR 0.72 03/14/2017     Lab Results   Component Value Date     03/14/2017     INR:  Lab Results   Component Value Date    INR 0.88 05/12/2015    INR 0.94 03/02/2015    INR 0.95 10/08/2014    INR 0.93 10/02/2012    INR 1.10 08/30/2012    INR 1.05 08/29/2012    INR 1.02 08/28/2012    INR 0.85 08/26/2012    INR 0.94 01/09/2012    INR 0.98 12/13/2011    INR 0.96 04/07/2011    INR 1.02 07/08/2004

## 2017-03-15 NOTE — PLAN OF CARE
Problem: Goal Outcome Summary  Goal: Goal Outcome Summary  PT: Orders received. PT evaluation completed. Pt lives in house with son with no steps to enter. Pt utilizes 4WW for ambulation, but reports occasional noncompliance with use. Today, pt able to complete bed mobility indep. Pt ambulates with 4WW x 100' with SBA/Indep. Pt reports minimal pain with mobility. Pt educated on importance of utilizing walker at all times for ambulation. Rec pt discharge home. Nursing updated. IPPT will sign off at this time.

## 2017-03-15 NOTE — ED PROVIDER NOTES
History     Chief Complaint:  Fall      HPI   Analia Higgins is a 87 year old female who presents after a fall. The patient states that she suffered a fall around 1700 today. She is unsure if she fainted beforehand, but states that she may have. She does not remember hitting her head, but she has new abrasions on her right cheek; she does not have a headache. The patient is complaining most about lower abdominal pain that radiates straight through to her low back. She states that this pain occurred after she fell. She denies headache, fever, visual disturbance, neck pain, or recent bowel issues. The patient comments that she has felt more tired than usual recently.     The patient's son was in the emergency department and he states that the patient lives with him. He leaves for work at 0700 and returns at 1930. His daughter (the patient's granddaughter) looks after the patient throughout the day. The patient's granddaughter did not witness the fall today or hear a thump. The patient's son returned home at 1930 and noticed that a table seemed to be out of order, evident of a potential fall. He states that he is considering different living arrangements for his mother, as it is becoming difficult to care for her.      Allergies:  Codeine      Medications:    Hydromorphone (dilaudid) 2 mg tablet  Donepezil (aricept) 5 mg tablet  Trazodone (desyrel) 50 mg tablet  Azithromycin (zithromax) 250 mg tablet  Ipratropium - albuterol 0.5 mg/2.5 mg/3 ml (duoneb) 0.5-2.5 (3) mg/3ml nebulization  Diltiazem 180 mg 24 hr cd capsule  Mirtazapine (remeron) 15 mg tablet  Cyanocobalamin (vitamin b12) 1000 mcg/ml injection  Cyanocobalamin (vitamin b12) 1000 mcg/ml injection  Albuterol (2.5 mg/3ml) 0.083% nebulizer solution  Aspirin 81 mg ec tablet  Acetaminophen (tylenol) 500 mg tablet  Cyanocobalamin (vitamin b12) 1000 mcg/ml injection  Multiple vitamins-minerals (centrovite) tabs  Nutritional supplements (ensure) liqd  Vitamin  d, cholecalciferol, po    Past Medical History:    Acute alcoholic hepatitis  Alcoholic cirrhosis of liver (H)  Closed fracture of unspecified part of humerus  CVA (cerebral infarction)  CVA (cerebral vascular accident) (H)  Kidney stone  Normal delivery  Pneumonia, organism unspecified  Sciatica  Tobacco use disorder  Unspecified disorder of lipoid metabolism  Need for prophylactic vaccination with tetanus-diphtheria (TD)  Atrial fibrillation with RVR (H)  Acute gastroenteritis  New onset atrial fibrillation (H)  Carotid stenosis  COPD (chronic obstructive pulmonary disease) (H)  Open fracture of part of upper end of humerus  Fracture of left hip (H)    Past Surgical History:    Vaginal hysterectomy  Lap,cholecystectomy/graph  Cataract iol, rt/lt  Open reduction internal fixation acetabulum left hip  Open reduction internal fixation humerus proximal  Right hip arthroplasty    Family History:    CAD - Father   Breast cancer - Sister   Asthma - Son     Social History:  Marital Status:   Presents to the ED with her son  Tobacco Use: Current everyday smoker, 1 ppd for 50 years   Alcohol Use: Yes  PCP: Karen Weiler      Review of Systems   Constitutional: Positive for fatigue. Negative for fever.   Eyes: Negative for visual disturbance.   Gastrointestinal: Positive for abdominal pain. Negative for constipation and diarrhea.   Musculoskeletal: Positive for back pain.   Skin: Positive for wound (abrasion to cheek and forhead).   Neurological: Positive for syncope (possible). Negative for headaches.   All other systems reviewed and are negative.        Physical Exam   First Vitals:  BP: 157/74  Pulse: 83  Heart Rate: 83  Temp: 98.3  F (36.8  C)  Resp: 16  SpO2: 97 %    Physical Exam   HENT:   Right Ear: External ear normal.   Left Ear: External ear normal.   Nose: Nose normal.   Superficial abrasion R cheek   Eyes: Conjunctivae and lids are normal.   Neck: Normal range of motion. Neck supple. No tracheal deviation  present.   No midline ttp   Cardiovascular: Regular rhythm and intact distal pulses.    Pulmonary/Chest: Breath sounds normal. No respiratory distress.   Abdominal: Soft. There is no tenderness. There is no rebound and no guarding.   Musculoskeletal:   + ttp mid Lumbar spine, no step off, remainder skeletal survey normal    BLE - pf/df 5/5, SILT, dp/pt 2/4    BUE -  5/5, SILT. Radial 2/4   Neurological: She is alert.   Poor historian but answers simple questions, MAEE, no gross focal motor or sensory deficit   Skin: Skin is warm and dry. She is not diaphoretic.   Psychiatric: She has a normal mood and affect.   Nursing note and vitals reviewed.        Emergency Department Course   ECG:  @ 2155  Indication: fall  Vent. Rate 80 bpm. ID interval 178 ms. QRS duration 126 ms. QT/QTc 410/472 ms. P-R-T axis 77 -67 21.   Normal sinus rhythm. Right bundle branch block. Left anterior fascicular block. Bifascicular block.  Abnormal ECG.  No significant change when compared to previous ECG from 5/12/15   Read @ 2202 by Dr. Torrez.       Imaging:  X-ray Chest, 2 views:    1. No convincing evidence of active cardiopulmonary disease.  2. Hyperinflated lungs, suggestive of chronic obstructive pulmonary  disease.  Preliminary radiology read.        Lumbar spine CT without contrast:  1. Acute mild compression fracture of the superior endplate of the L1  vertebral body.  2. No other visualized acute fractures of the lumbar spine.  3. Straightening of the normal lumbar lordosis, otherwise normal  alignment of the lumbar spine.  4. Moderate to severe degenerative changes throughout the lumbar  spine.  5. Partial visualization of a 3.5 cm abdominal aortic aneurysm.  6. A few nonobstructing bilateral renal calculi.  Preliminary radiology read.        Abd/Pelvis CT, IV contrast only, TRAUMA AAA:  1. Acute mild compression fracture of the superior endplate of the L1  vertebral body.  2. No other cause of acute pain identified in the  abdomen or pelvis.  3. 3.6 x 3.5 cm abdominal aortic aneurysm, increased in caliber since  5/22/2009 when it measured 2.4 x 2.3 cm.  4. 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 There are a few nonobstructing calculi in both kidneys.  5. Colonic diverticulosis without evidence of diverticulitis  Preliminary radiology read.       Head CT without contrast:    No evidence of acute intracranial abnormality.  Preliminary radiology read.           Radiographic findings were communicated with the patient and family who voiced understanding of the findings.      Laboratory:  UA: Slightly Cloudy yellow urine, mucous present, Hyaline casts otherwise WNL     CBC:  WBC 13.1 (H), HGB 13.5,     BMP: Glucose 104 (H), otherwise WNL (Creatinine 0.72)    (2200) Troponin I: <0.015     Interventions:  (2314) Normal Saline, 1 liter, IV bolus    (2314) Tylenol, 650 mg, PO   (0025) Dilaudid, 0.2 mg, IV     Emergency Department Course:  Nursing notes and vitals reviewed.  I performed an exam of the patient as documented above.   EKG was done, interpretation as above.  IV inserted.   Blood was drawn from the patient. This was sent for laboratory testing, findings above.   The patient was sent for a chest x-ray, lumbar spine CT, abd/pelvis CT, and head CT while in the emergency department, findings above.    Findings and plan explained to the patient and her son who consent to admission.   (3177) I discussed the patient with Dr. Ha of the hospitalist service, who will admit the patient to Observation in a med tele bed for further monitoring, evaluation, and treatment.       Impression & Plan       Medical Decision Making:  Patient is a 87 year old female who lives at home with her son who had an unwitnessed fall today. She states that she has been more tired than usual. We will undertake a traumatic workup. She is neurologically intact, has a scrape on the right cheek and is complaining of some lower  back pain. We will do a CT scan of the head and abdomen, and a CT of the c-spine, x-ray of the chest, medical workup: basic blood tests, EKG, cardiac monitoring, intravenous fluids.     The patient remained stable here in the emergency department. Urinalysis negative, basic blood test showing a mild leukocytosis, otherwise unremarkable. EKG, troponin unremarkable. Imaging most remarkable for L1 compression fracture that appears to be acute. She does have an incidental note of an aortic aneurysm of 3.5 cm, this can be followed as an outpatient, likely would not be a good surgical candidate and is not of significant size where she would be considered for this.       We will admit to observation here for pain control and immobilization. She may need transitional care. She was made aware of chronic abnormalities on the CT scan in the abdomen.     Diagnosis:    ICD-10-CM    1. Fall, initial encounter W19.XXXA    2. Compression fracture of L1 lumbar vertebra, closed, initial encounter (H) S32.010A        Disposition:  Admit to Observation.     IJorge, am serving as a scribe on 3/14/2017 at 11:21 PM to personally document services performed by Dr. Torrez based on my observations and the provider's statements to me.     3/14/2017   [facility] EMERGENCY DEPARTMENT       Lb Torrez MD  03/15/17 0568

## 2017-03-15 NOTE — H&P
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  Only initial complaints was some lower abdominal pain and back pain.  Imaging studies including a CT of the abdomen and pelvis, head CT, and CT of the L-spine were only significant for an acute L1 appearing superior endplate mild compression fracture.    Problem list  1. Fall with clinical findings only significant for a mild L1 superior endplate compression fracture: This appears to be acute and secondary to her fall.  It's not entirely clear whether or not she had a syncopal event as this was witnessed but has not had any significant syncopal events in the past.  EKG is otherwise unremarkable.  Currently, she has no focal neurologic deficits.  She is able to sit up and rolled over in bed with mild assistance at this time.  2. History of alcohol cirrhosis: Patient does not appear to be intoxicated and denies any recent alcohol ingestion  3. Hypertension  4. Mild dementia: Currently pleasant with no evidence of acute delirium  5. History of CVA: Clinically does not have any focal neurologic findings  6. History of COPD: No evidence of any acute exacerbation    Plan:    Observation admission for pain control and physical therapy assessment for appropriate disposition    Do not suspect that patient will require surgical intervention but will order neurosurgical consultation per trauma protocol as this fracture appears acute.  Question if she would benefit from either a brace or an elastic corset    Schedule Tylenol 1 g p.o. t.i.d. with p.r.n. p.o.  Dilaudid.  She does have this listed as a prior to admission medication which she tolerates on a p.r.n. basis.  Additionally, offer mentholated patches p.r.n.    May resume her chronic diltiazem    Delirium precautions.  Resume Aricept and Remeron    Although doubt that she had a true syncopal event, we'll monitor her on telemetry.    -Additional workup plan which will include neurosurgical consultation    All lab work and imaging data independently reviewed by myself    Code status: At this time, patient voices possibility of being DNR/DNI.  In reviewing prior code status, her last code status was DNR/DNI as placed by her primary care physician but patient's code status while inpatient has been variable.  At this time, I am not confident that the patient has that ability to make this decision.  Decision may need to be addressed with the patient's son.  I do not see a polst chart.    Prophylaxis;  Ambulation.   Discharge: tbd.   to assist with discharge planning as son has voiced wanting to place patient in a different level of care.  Also will depend on physical therapy assessment.    Chief Complaint:   Fall/back pain   HPI  Patient is a pleasant 87-year-old female who presents here after being found down by her son after a presumed fall.  This was unwitnessed and occurred around 1700 p.m. yesterday.  Past medical history is significant for early cirrhosis of the liver secondary to alcohol, previous CVA with no significant neural deficits, copd, sciatica, and tobaccoism.  She reports that she felt a bit faint before the fall.  She does not remember hitting her head but had some notable superficial abrasions on her right cheek.  She complained more of lower abdominal pain as well as back pain.  She denied pain prior to the fall.  Per the report of the son to the emergency room staff, he states that the patient lives with him.  He generally use for work at 0700 and returns back at 1930 p.m.  The  patient's granddaughter generally stays with the patient throughout the day but did not witness the fall nor hear a thump  When the son returned home at around 1930, he noticed that the table seemed to be a bit out of order with some evidence of a possible fall.  He found her and brought her and brought her in for further evaluation.  He does voice some concerns about being unable to care for her longer and is considering looking for different living arrangements for the patient.  I did discuss the case in detail with the ED physician.     Past Medical History:     Past Medical History   Diagnosis Date     Acute alcoholic hepatitis      elevated LFTs due to ETOH ( -160's in past)     Alcoholic cirrhosis of liver (H) 7/04     early cirrhosis of liver; liver biopsy done during lap tomas     Closed fracture of unspecified part of humerus 1/2012     Arm fracture - 8 screws     CVA (cerebral infarction) 2/13/2012     CVA (cerebral vascular accident) (H)      Kidney stone      Normal delivery      normal vaginal deliveries x 5, no complications with any of the pregnancies or deliveries     Other abnormal blood chemistry 2000     Other and unspecified alcohol dependence, continuous drinking behavior 2000     Pneumonia, organism unspecified      Sciatica      Right sided     Tobacco use disorder      Unspecified disorder of lipoid metabolism      Past Surgical History:     Past Surgical History   Procedure Laterality Date     C vaginal hysterectomy  1971     Hysterectomy, Vaginal, with cystocele repair     Hc lap,cholecystectomy/graph  7/04     Cataract iol, rt/lt  8/06     Bilateral - Dr Ronquillo     Open reduction internal fixation acetabulum  april 2011     left hip     Open reduction internal fixation humerus proximal  1/9/2012     Procedure:OPEN REDUCTION INTERNAL FIXATION HUMERUS PROXIMAL; OPEN REDUCTION INTERNAL FIXATION HUMERUS PROXIMAL ; Surgeon:JEN MCCRAY; Location:[facility] OR     Arthroplasty hip  8/27/2012      Procedure: ARTHROPLASTY HIP;  RIGHT HIP BIPOLAR (BRENT);  Surgeon: Alessio Finley MD;  Location:  OR     Social History:     Social History   Substance Use Topics     Smoking status: Current Every Day Smoker     Packs/day: 1.00     Years: 50.00     Types: Cigarettes     Smokeless tobacco: Never Used      Comment: STARTED SMOKING IN 1947     Alcohol use 21.0 oz/week      Comment: 3 or 4 drinks of Vodka q day for  60 years - as of 05/062013 about 2 ozs of 80 vodka daily (son measures)     Family History:  Family history reviewed. NO pertinent family history     Allergies:     Allergies   Allergen Reactions     Codeine      vomiting     Peanuts [Nuts]      Medications:     Prescriptions Prior to Admission   Medication Sig Dispense Refill Last Dose     HYDROmorphone (DILAUDID) 2 MG tablet Take 1 tablet (2 mg) by mouth every 6 hours as needed for pain 20 tablet 0      donepezil (ARICEPT) 5 MG tablet Take 1 tablet (5 mg) by mouth At Bedtime 90 tablet 2      traZODone (DESYREL) 50 MG tablet Take 1 tablet (50 mg) by mouth At Bedtime 90 tablet 0      order for DME Equipment being ordered: Nebulizer 1 Device 0      azithromycin (ZITHROMAX) 250 MG tablet Two tablets first day, then one tablet daily for four days. 6 tablet 0      ipratropium - albuterol 0.5 mg/2.5 mg/3 mL (DUONEB) 0.5-2.5 (3) MG/3ML nebulization Take 1 vial (3 mLs) by nebulization 4 times daily as needed 1 Box 4      diltiazem 180 MG 24 hr CD capsule Take 1 capsule (180 mg) by mouth daily Must be seen for further refills 90 capsule 2      mirtazapine (REMERON) 15 MG tablet Take 1 tablet (15 mg) by mouth At Bedtime 90 tablet 1      cyanocobalamin (VITAMIN B12) 1000 MCG/ML injection Inject 1 mL (1,000 mcg) into the muscle every 30 days 1 mL 11      cyanocobalamin (VITAMIN B12) 1000 MCG/ML injection Inject 1 mL (1,000 mcg) into the muscle every 30 days 1 mL 0      albuterol (2.5 MG/3ML) 0.083% nebulizer solution Take 1 vial (2.5 mg) by nebulization every 6  hours as needed for shortness of breath / dyspnea or wheezing 75 mL 0 Taking     aspirin 81 MG EC tablet Take 4 tablets (325 mg) by mouth daily 30 tablet 11 Taking     acetaminophen (TYLENOL) 500 MG tablet Take 1 tablet (500 mg) by mouth 3 times daily 100 tablet 0 Taking     cyanocobalamin (VITAMIN B12) 1000 MCG/ML injection Inject 1 mL into the muscle every 30 days   Taking     Multiple Vitamins-Minerals (CENTROVITE) TABS Take 1 tablet by mouth daily 90 tablet 1 Taking     Nutritional Supplements (ENSURE) LIQD 2 cans of Ensure daily 60 Can 11 Taking     VITAMIN D, CHOLECALCIFEROL, PO Take 2,000 Units by mouth daily.     Taking     ORDER FOR DME    Taking       Review of Systems:   A Comprehensive greater than 10 system review of systems was carried out.  Pertinent positives and negatives are noted above.  Otherwise negative for contributory information.        Physical Exam:  Blood pressure 179/84, pulse 87, temperature 97.9  F (36.6  C), temperature source Oral, resp. rate 16, SpO2 93 %, not currently breastfeeding.  Gen: Pleasant in no acute distress.  HEENT: NCAT. EOMI. PERRL.  Neck: Normal inspection. No bruit, JVD or thyromegaly.  Lungs: Normal respiratory effort.Clear to auscultation bilaterally with no crackles or wheezes.  Card: N s1s2. RRR. No M/R/G.  Peripheral pulses present and symmetric.   Abd: Soft NT ND. No mass. Normal bowel sounds.  Back: mild midline tenderness in mid-low back.  Skin: No rash. Warm to the touch  Extr: No edema. CMS intact  Psychiatric: Patient alert oriented ×3.  Normal affect  Neurologic: Cranial nerves II-XII are intact.  Sensation normal.  Motor strength 5/5    Data:       Recent Labs  Lab 03/14/17  2200   WBC 13.1*   HGB 13.5   HCT 38.7   MCV 93          Recent Labs  Lab 03/14/17  2200      POTASSIUM 3.6   CHLORIDE 97   CO2 25   ANIONGAP 13   *   BUN 19   CR 0.72   GFRESTIMATED 77   GFRESTBLACK >90African American GFR Calc   STEPHANIE 8.6     No results for  input(s): INR in the last 168 hours.    Imaging:   Recent Results (from the past 24 hour(s))   Head CT w/o contrast    Narrative    CT HEAD WITHOUT CONTRAST  3/14/2017 11:59 PM     HISTORY: Unwitnessed fall.    COMPARISON: 1/9/2015.    TECHNIQUE: Without intravenous contrast, helical sections were  acquired through the brain. Coronal reconstructions were generated.  Radiation dose for this scan was reduced using automated exposure  control, adjustment of the mA and/or kV according to the patient's  size, or iterative reconstruction technique.    FINDINGS: Moderate diffuse cerebral atrophy. Moderate bilateral  periventricular white matter low attenuation, likely relating to  chronic small vessel ischemic disease. Encephalomalacia in the left  occipital lobe. No intra-axial mass, mass affect or midline shift.  Normal gray-white matter differentiation. No visualized acute  intra-axial hemorrhage. The cerebral ventricles are normal in caliber.  The basal cisterns are patent. No extra-axial fluid collection. The  visualized portions of the paranasal sinuses and mastoid air cells are  unremarkable.      Impression    IMPRESSION: No evidence of acute intracranial abnormality.    KULWANT LAMBERT MD   Abd/pelvis CT,  IV  contrast only TRAUMA / AAA    Narrative    CT ABDOMEN AND PELVIS WITH CONTRAST  3/15/2017 12:16 AM     HISTORY: Fall. Lower back pain.    COMPARISON: 5/22/2009.    TECHNIQUE: Note that this study was performed in conjunction with a CT  scan of the lumbar spine. Refer to a separate report for findings from  that study. Following the uneventful administration of 50 mL  Isovue-370 intravenous contrast, helical sections were acquired from  the top of the diaphragm through the pubic symphysis. Coronal  reconstructions were generated. Radiation dose for this scan was  reduced using automated exposure control, adjustment of the mA and/or  kV according to the patient's size, or iterative  reconstruction  technique.    FINDINGS:     Abdomen: The liver, spleen, pancreas and adrenal glands are  unremarkable. Mildly dilated right intrarenal collecting system and  extrarenal pelvis with normal caliber ureter again noted, suggestive  of a chronic ureteropelvic junction obstruction. Symmetrical  enhancement to both kidneys. A few nonobstructing calculi in the right  renal pelvis, the largest measuring 0.9 cm. Two tiny less than 0.3 cm  diameter nonobstructing left renal calculi. Prior cholecystectomy. 3.6  x 3.5 cm abdominal aortic aneurysm, increased in caliber since  5/22/2009 when the abdominal aorta measured 2.4 x 2.3 cm.  Atherosclerotic calcification in the abdominal aorta. No enlarged  lymph nodes or free fluid in the upper abdomen.    Scan through the lower chest is significant for coronary artery  calcification.    Pelvis: The small and large bowel are normal in caliber. Several  diverticula are present in the colon without evidence of  diverticulitis. The appendix is not visualized. No bowel wall  thickening, pneumatosis or free intraperitoneal gas. The uterus is not  visualized. No enlarged lymph nodes or free fluid in the pelvis.  Partial visualization of a right hip arthroplasty. Surgical screws are  present in the left femoral head and neck. Acute mild compression  fracture of the superior endplate of the L1 vertebral body.      Impression    IMPRESSION:   1. Acute mild compression fracture of the superior endplate of the L1  vertebral body.  2. No other cause of acute pain identified in the abdomen or pelvis.  3. 3.6 x 3.5 cm abdominal aortic aneurysm. On the comparison study  dated 5/22/2009, the abdominal aorta measured 2.4 x 2.3 cm.  4. 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 There are a few nonobstructing calculi in both kidneys.  5. Colonic diverticulosis without evidence of diverticulitis.    KULWANT LAMBERT MD   Lumbar spine CT w/o contrast    Narrative     CT LUMBAR SPINE  3/15/2017 12:16 AM     HISTORY: Fall. Low back pain.    COMPARISON: None.    TECHNIQUE: Note that this study was performed in conjunction with a CT  scan of the abdomen and pelvis. Refer to a separate report for  findings from that study. Helical sections were acquired through the  lumbar spine. Coronal and sagittal reconstructions were generated.  Radiation dose for this scan was reduced using automated exposure  control, adjustment of the mA and/or kV according to the patient's  size, or iterative reconstruction technique.      Impression    IMPRESSION:   1. Acute mild compression fracture of the superior endplate of the L1  vertebral body.  2. No other visualized acute fractures of the lumbar spine.  3. Straightening of the normal lumbar lordosis, otherwise normal  alignment of the lumbar spine.  4. Moderate to severe degenerative changes throughout the lumbar  spine.  5. Partial visualization of a 3.5 cm abdominal aortic aneurysm.  6. A few nonobstructing bilateral renal calculi.    KULWANT LAMBERT MD   XR Chest 2 Views    Narrative    CHEST 2 VIEWS  3/15/2017 12:27 AM     HISTORY: Chest pain and shortness of breath.    COMPARISON: 9/22/2016.    FINDINGS: Hyperinflated lungs. The lungs are clear. Mild cardiomegaly.  Atherosclerotic calcification in the thoracic aorta. Partial  visualization of surgical hardware in the proximal right humerus.      Impression    IMPRESSION:   1. No convincing evidence of active cardiopulmonary disease.  2. Hyperinflated lungs, suggestive of chronic obstructive pulmonary  disease.    MD Eboni MULLIGAN MD Pager [phone]

## 2017-03-15 NOTE — PLAN OF CARE
Problem: Discharge Planning  Goal: Discharge Planning (Adult, OB, Behavioral, Peds)  Outcome: No Change  PRIMARY DIAGNOSIS: Acute Traumatic Pain  OUTPATIENT/OBSERVATION GOALS TO BE MET BEFORE DISCHARGE:     1. Tolerate PO Intake: Yes  2. Cleared for discharge from consultants involved: No  3. ADLs back to baseline? Yes. Pt ambulating  independently with walker  4. Activity and level of assistance: Up with SBA   5. Pain status: Pt denies pain  6. Barriers to discharge noted Yes. SW following     Patient is alert and oriented x3. Denies pain this morning. Pt able to ambulate independently with walker. SW following with pt placement. Will continue to assess.

## 2017-03-15 NOTE — DISCHARGE SUMMARY
[facility] Outpatient / Observation Unit  Discharge Summary        Analia Higgins MRN# [id number]   YOB: 1929 Age: 87 year old     Date of Admission:  3/14/2017  Date of Discharge:  3/15/2017  3:04 PM  Admitting Physician:  Eboni Ha MD  Discharge Physician: Valery Braswell PA-C  Discharging Service: Hospitalist      Primary Provider: Weiler, Karen  Primary Care Physician Phone Number: [phone]         Primary Discharge Diagnoses:    Analia Higgins was admitted on 3/14/2017 for acute back pain secondary to fall.     1.  L1 compression fx s/p fall.   2.  Presence of known AAA 3.6mm from 2.4 in 2009. will need outpt  follow up.           Secondary Discharge Diagnoses:     Past Medical History   Diagnosis Date     Acute alcoholic hepatitis      elevated LFTs due to ETOH ( -160's in past)     Alcoholic cirrhosis of liver (H) 7/04     early cirrhosis of liver; liver biopsy done during lap tomas     Closed fracture of unspecified part of humerus 1/2012     Arm fracture - 8 screws     CVA (cerebral infarction) 2/13/2012     CVA (cerebral vascular accident) (H)      Kidney stone      Normal delivery      normal vaginal deliveries x 5, no complications with any of the pregnancies or deliveries     Other abnormal blood chemistry 2000     Other and unspecified alcohol dependence, continuous drinking behavior 2000     Pneumonia, organism unspecified      Sciatica      Right sided     Tobacco use disorder      Unspecified disorder of lipoid metabolism               Code Status:      Full Code        Brief Hospital Summary:           Please refer to initial admission history and physical for further details.   Patient is a pleasant 87-year-old female with a history significant for early cirrhosis secondary to alcohol, previous CVA but otherwise independent, sciatica, COPD, and tobaccoism who presents here with a fall event. Only initial complaints was some lower abdominal pain and back pain.  Imaging studies including a CT of the abdomen and pelvis, head CT, and CT of the L-spine were only significant for an acute L1 appearing superior endplate mild compression fracture.  Pt was admitted to the OBS unit. She underwent pain control with just ES tylenol TID. She was able to ambulate with stand by assist the next day. Her sxs were improved. It was not completely sure whether her fall was mechanical vs syncope. Tele overnight did not show any dysrhythmias there were no other causes of syncope nor has she had other hx of syncope in the past. I did have long conversation with her family abt her continues alcohol use and her son reassured me that she take 2 shot glasses of Vodka nightly and nothing more. I did recommend to family that ultimately she needs to decrease her alcohol intake even more given her hx of abuse.    She was seen by PT and cleared her for discharge home. She will be sent home with home RN to [facility].            Significant Lab During Hospitalization:        Recent Labs  Lab 03/14/17  2200   WBC 13.1*   HGB 13.5   HCT 38.7   MCV 93          Recent Labs  Lab 03/14/17  2200      POTASSIUM 3.6   CHLORIDE 97   CO2 25   ANIONGAP 13   *   BUN 19   CR 0.72   GFRESTIMATED 77   GFRESTBLACK >90African American GFR Calc   STEPHANIE 8.6                Significant Imaging During Hospitalization:      Results for orders placed or performed during the hospital encounter of 03/14/17   Head CT w/o contrast    Narrative    CT HEAD WITHOUT CONTRAST  3/14/2017 11:59 PM     HISTORY: Unwitnessed fall.    COMPARISON: 1/9/2015.    TECHNIQUE: Without intravenous contrast, helical sections were  acquired through the brain. Coronal reconstructions were generated.  Radiation dose for this scan was reduced using automated exposure  control, adjustment of the mA and/or kV according to the patient's  size, or iterative reconstruction technique.    FINDINGS: Moderate diffuse cerebral atrophy. Moderate  bilateral  periventricular white matter low attenuation, likely relating to  chronic small vessel ischemic disease. Encephalomalacia in the left  occipital lobe. No intra-axial mass, mass affect or midline shift.  Normal gray-white matter differentiation. No visualized acute  intra-axial hemorrhage. The cerebral ventricles are normal in caliber.  The basal cisterns are patent. No extra-axial fluid collection. The  visualized portions of the paranasal sinuses and mastoid air cells are  unremarkable.      Impression    IMPRESSION: No evidence of acute intracranial abnormality.    KULWANT LAMBERT MD   Abd/pelvis CT,  IV  contrast only TRAUMA / AAA    Narrative    CT ABDOMEN AND PELVIS WITH CONTRAST  3/15/2017 12:16 AM     HISTORY: Fall. Lower back pain.    COMPARISON: 5/22/2009.    TECHNIQUE: Note that this study was performed in conjunction with a CT  scan of the lumbar spine. Refer to a separate report for findings from  that study. Following the uneventful administration of 50 mL  Isovue-370 intravenous contrast, helical sections were acquired from  the top of the diaphragm through the pubic symphysis. Coronal  reconstructions were generated. Radiation dose for this scan was  reduced using automated exposure control, adjustment of the mA and/or  kV according to the patient's size, or iterative reconstruction  technique.    FINDINGS:     Abdomen: The liver, spleen, pancreas and adrenal glands are  unremarkable. Mildly dilated right intrarenal collecting system and  extrarenal pelvis with normal caliber ureter again noted, suggestive  of a chronic ureteropelvic junction obstruction. Symmetrical  enhancement to both kidneys. A few nonobstructing calculi in the right  renal pelvis, the largest measuring 0.9 cm. Two tiny less than 0.3 cm  diameter nonobstructing left renal calculi. Prior cholecystectomy. 3.6  x 3.5 cm abdominal aortic aneurysm, increased in caliber since  5/22/2009 when the abdominal aorta measured 2.4 x  2.3 cm.  Atherosclerotic calcification in the abdominal aorta. No enlarged  lymph nodes or free fluid in the upper abdomen.    Scan through the lower chest is significant for coronary artery  calcification.    Pelvis: The small and large bowel are normal in caliber. Several  diverticula are present in the colon without evidence of  diverticulitis. The appendix is not visualized. No bowel wall  thickening, pneumatosis or free intraperitoneal gas. The uterus is not  visualized. No enlarged lymph nodes or free fluid in the pelvis.  Partial visualization of a right hip arthroplasty. Surgical screws are  present in the left femoral head and neck. Acute mild compression  fracture of the superior endplate of the L1 vertebral body.      Impression    IMPRESSION:   1. Acute mild compression fracture of the superior endplate of the L1  vertebral body.  2. No other cause of acute pain identified in the abdomen or pelvis.  3. 3.6 x 3.5 cm abdominal aortic aneurysm. On the comparison study  dated 5/22/2009, the abdominal aorta measured 2.4 x 2.3 cm.  4. 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 There are a few nonobstructing calculi in both kidneys.  5. Colonic diverticulosis without evidence of diverticulitis.    KULWANT LAMBERT MD   Lumbar spine CT w/o contrast    Narrative    CT LUMBAR SPINE  3/15/2017 12:16 AM     HISTORY: Fall. Low back pain.    COMPARISON: None.    TECHNIQUE: Note that this study was performed in conjunction with a CT  scan of the abdomen and pelvis. Refer to a separate report for  findings from that study. Helical sections were acquired through the  lumbar spine. Coronal and sagittal reconstructions were generated.  Radiation dose for this scan was reduced using automated exposure  control, adjustment of the mA and/or kV according to the patient's  size, or iterative reconstruction technique.      Impression    IMPRESSION:   1. Acute mild compression fracture of the superior  endplate of the L1  vertebral body.  2. No other visualized acute fractures of the lumbar spine.  3. Straightening of the normal lumbar lordosis, otherwise normal  alignment of the lumbar spine.  4. Moderate to severe degenerative changes throughout the lumbar  spine.  5. Partial visualization of a 3.5 cm abdominal aortic aneurysm.  6. A few nonobstructing bilateral renal calculi.    KULWANT LAMBERT MD   XR Chest 2 Views    Narrative    CHEST 2 VIEWS  3/15/2017 12:27 AM     HISTORY: Chest pain and shortness of breath.    COMPARISON: 9/22/2016.    FINDINGS: Hyperinflated lungs. The lungs are clear. Mild cardiomegaly.  Atherosclerotic calcification in the thoracic aorta. Partial  visualization of surgical hardware in the proximal right humerus.      Impression    IMPRESSION:   1. No convincing evidence of active cardiopulmonary disease.  2. Hyperinflated lungs, suggestive of chronic obstructive pulmonary  disease.    KULWANT LAMBERT MD              Pending Results:        Unresulted Labs Ordered in the Past 30 Days of this Admission     No orders found for last 61 day(s).              Consultations This Hospital Stay:      Consultation during this admission received from Neurosurgery         Discharge Instructions and Follow-Up:        Pt instructed to follow up with PCP in 7 days.  Follow up with outpatient therapy if indicated.         Discharge Disposition:      Discharged to home         Discharge Medications:        Current Discharge Medication List      START taking these medications    Details   traMADol (ULTRAM) 50 MG tablet Take 0.5 tablets (25 mg) by mouth every 6 hours as needed for moderate to severe pain  Qty: 5 tablet, Refills: 0    Associated Diagnoses: Compression fracture of L1 lumbar vertebra, closed, initial encounter (H)      menthol (ICY HOT) 5 % PADS Apply 1 patch topically every 8 hours as needed for muscle soreness  Qty: 5 patch, Refills: 0    Associated Diagnoses: Compression fracture of L1  lumbar vertebra, closed, initial encounter (H)      docusate sodium (COLACE) 100 MG capsule Take 1 capsule (100 mg) by mouth 2 times daily  Qty: 30 capsule, Refills: 0    Associated Diagnoses: Compression fracture of L1 lumbar vertebra, closed, initial encounter (H)         CONTINUE these medications which have CHANGED    Details   acetaminophen (TYLENOL) 500 MG tablet Take 2 tablets (1,000 mg) by mouth 3 times daily  Qty: 1 Bottle, Refills: 0    Associated Diagnoses: Compression fracture of L1 lumbar vertebra, closed, initial encounter (H)         CONTINUE these medications which have NOT CHANGED    Details   aspirin 325 MG tablet Take 325 mg by mouth daily      donepezil (ARICEPT) 5 MG tablet Take 1 tablet (5 mg) by mouth At Bedtime  Qty: 90 tablet, Refills: 2    Associated Diagnoses: Dementia      traZODone (DESYREL) 50 MG tablet Take 1 tablet (50 mg) by mouth At Bedtime  Qty: 90 tablet, Refills: 0    Associated Diagnoses: Chronic pain syndrome      ipratropium - albuterol 0.5 mg/2.5 mg/3 mL (DUONEB) 0.5-2.5 (3) MG/3ML nebulization Take 1 vial (3 mLs) by nebulization 4 times daily as needed  Qty: 1 Box, Refills: 4    Associated Diagnoses: Chronic obstructive pulmonary disease, unspecified COPD type (H)      diltiazem 180 MG 24 hr CD capsule Take 1 capsule (180 mg) by mouth daily Must be seen for further refills  Qty: 90 capsule, Refills: 2    Associated Diagnoses: Atrial fibrillation with RVR (H)      mirtazapine (REMERON) 15 MG tablet Take 1 tablet (15 mg) by mouth At Bedtime  Qty: 90 tablet, Refills: 1    Associated Diagnoses: Primary insomnia      albuterol (2.5 MG/3ML) 0.083% nebulizer solution Take 1 vial (2.5 mg) by nebulization every 6 hours as needed for shortness of breath / dyspnea or wheezing  Qty: 75 mL, Refills: 0    Associated Diagnoses: COPD (chronic obstructive pulmonary disease) (H)      Multiple Vitamins-Minerals (CENTROVITE) TABS Take 1 tablet by mouth daily  Qty: 90 tablet, Refills: 1     Associated Diagnoses: Alcohol abuse      VITAMIN D, CHOLECALCIFEROL, PO Take 2,000 Units by mouth daily.        !! order for DME Equipment being ordered: Nebulizer  Qty: 1 Device, Refills: 0    Associated Diagnoses: Chronic obstructive pulmonary disease, unspecified COPD type (H)      cyanocobalamin (VITAMIN B12) 1000 MCG/ML injection Inject 1 mL into the muscle every 30 days      Nutritional Supplements (ENSURE) LIQD 2 cans of Ensure daily  Qty: 60 Can, Refills: 11    Associated Diagnoses: Poor nutrition      !! ORDER FOR DME     Comments: Wheel chair and bed alarm  Associated Diagnoses: Fracture of humerus, proximal, right, open; Fall       !! - Potential duplicate medications found. Please discuss with provider.      STOP taking these medications       HYDROmorphone (DILAUDID) 2 MG tablet Comments:   Reason for Stopping:         aspirin 81 MG EC tablet Comments:   Reason for Stopping:                   Allergies:         Allergies   Allergen Reactions     Codeine      vomiting     Peanuts [Nuts]            Condition and Physical on Discharge:      Discharge condition: Stable   Vitals: Blood pressure 187/88, pulse 87, temperature 97.2  F (36.2  C), temperature source Oral, resp. rate 16, SpO2 94 %, not currently breastfeeding.  0 lbs 0 oz      GENERAL:  Comfortable.  PSYCH: pleasant, oriented, No acute distress.  HEENT:  PERRLA. Normal conjunctiva, normal hearing, nasal mucosa and Oropharynx are normal.  NECK:  Supple, no neck vein distention, adenopathy or bruits, normal thyroid.  HEART:  Normal S1, S2 with no murmur, no pericardial rub, gallops or S3 or S4.  LUNGS:  Clear to auscultation, normal Respiratory effort. No wheezing, rales or ronchi.  ABDOMEN:  Soft, no hepatosplenomegaly, normal bowel sounds. Non-tender, non distended.   EXTREMITIES:  No pedal edema, +2 pulses bilateral and equal. Back symmetric, no curvature. ROM normal. No CVA tenderness.  SKIN:  Dry to touch, No rash, wound or  ulcerations.  NEUROLOGIC:  CN 2-12 intact, BL 5/5 symmetric upper and lower extremity strength, sensation is intact with no focal deficits.

## 2017-03-15 NOTE — ED NOTES
Patient arrived at around 21:45 complaining of unwitnessed fall at home. Son is POA and came home to patient reporting a fall and having some mild confusion. EMS called and brought here for evaluation. Patient able to answer questions appropriately. Frequently asking why her back hurts. Forgetful at times. Reports having a fall but not remembering details related to fall. Denies loss of consciousness and hitting head. Small abrasion noted on right cheek. ABCs intact. Alert and oriented X3-4. Oriented to room and call light.

## 2017-03-15 NOTE — PLAN OF CARE
Problem: Discharge Planning  Goal: Discharge Planning (Adult, OB, Behavioral, Peds)  Outcome: No Change  PRIMARY DIAGNOSIS: Acute Traumatic Pain  OUTPATIENT/OBSERVATION GOALS TO BE MET BEFORE DISCHARGE:     1. Tolerate PO Intake: Yes  2. Cleared for discharge from consultants involved: No  3. ADLs back to baseline?  No  4. Activity and level of assistance: Up with maximum assistance.PT evaluation scheduled   5. Pain status: Improved but still requiring IV narcotics.  6. Barriers to discharge noted Yes      Patient is alert and oriented x2, a-2 with walker with ambulation. C/o pain to low mid back, prn dilaudid given. Icy hot patch placed. IV site saline locked. Small scratches noted on legs and arms. Bed alarm set. Will continue to monitor, assess, and offer supportive cares

## 2017-03-15 NOTE — PROGRESS NOTES
Received call from [employer].  Patient would be self pay and would need a $5000 deposit since MA would not pay.

## 2017-03-15 NOTE — CONSULTS
Discharge Placement        Facility/Agency Request Status Selected? Address Phone Number Fax Number     [facility] (SNF) Pending - Request Sent     [address], [place] [address] [phone]     [facility] ([facility] Pending - Request Sent     [address] [place] [id number] [phone]     [facility] - REFERRAL ONLY (SNF)          Care Transition Initial Assessment - RN        Met with: Family.    DATA   Active Problems:    Fall, initial encounter    Compression fracture of L1 lumbar vertebra, closed, initial encounter (H)     Cognitive Status: awake and alert.  Contact information and PCP information verified: Yes  Lives With: child(demi), adult  Living Arrangements: house   Insurance concerns: No Insurance issues identified    ASSESSMENT  Patient currently receives the following services:  None, but identified needs for in home or tcu due to recently falls.        Identified issues/concerns regarding health management: Fall and safety issues at home, PT signed off with no recommendation for TCU.      PLAN  Financial costs for the patient include YES.  Patient given options and choices for discharge YES.  Patient/family is agreeable to the plan?  Yes: discussing the option with son and daughter  Patient anticipates discharging to Pending Placement vs Home with family w/wo services.     Patient anticipates needs for home equipment: Yes  Plan/Disposition: Home   Care  (CTS) will continue to follow as needed.      Lzi Adams RN BSN CTS  [facility]   Care Management Coordinator  [email or web address]   [phone]

## 2017-03-15 NOTE — PHARMACY-ADMISSION MEDICATION HISTORY
Admission medication history interview status for this patient is complete. See [facility] admission navigator for allergy information, prior to admission medications and immunization status.     Medication history interview source(s):Patient  Medication history resources (including written lists, pill bottles, clinic record):None  Primary pharmacy: [facility]    Changes made to PTA medication list:  Added: none  Deleted: azithromycin  Changed: none    Actions taken by pharmacist (provider contacted, etc):None   Additional medication history information:None  Medication reconciliation/reorder completed by provider prior to medication history? No    For patients on insulin therapy: No    Prior to Admission medications    Medication Sig Last Dose Taking? Auth Provider   acetaminophen (TYLENOL) 500 MG tablet Take 500 mg by mouth 3 times daily 3/14/2017 at Unknown time Yes Unknown, Entered By History   acetaminophen (TYLENOL) 500 MG tablet Take 500-1,000 mg by mouth every 8 hours as needed for mild pain or fever Past Month at Unknown time Yes Unknown, Entered By History   aspirin 325 MG tablet Take 325 mg by mouth daily 3/14/2017 at Unknown time Yes Unknown, Entered By History   HYDROmorphone (DILAUDID) 2 MG tablet Take 1 tablet (2 mg) by mouth every 6 hours as needed for pain Past Week at Unknown time Yes Weiler, Karen, MD   donepezil (ARICEPT) 5 MG tablet Take 1 tablet (5 mg) by mouth At Bedtime 3/14/2017 at Unknown time Yes Weiler, Karen, MD   traZODone (DESYREL) 50 MG tablet Take 1 tablet (50 mg) by mouth At Bedtime 3/14/2017 at Unknown time Yes Weiler, Karen, MD   ipratropium - albuterol 0.5 mg/2.5 mg/3 mL (DUONEB) 0.5-2.5 (3) MG/3ML nebulization Take 1 vial (3 mLs) by nebulization 4 times daily as needed Past Month at Unknown time Yes Weiler, Karen, MD   diltiazem 180 MG 24 hr CD capsule Take 1 capsule (180 mg) by mouth daily Must be seen for further refills 3/14/2017 at Unknown time Yes Weiler, Karen, MD   mirtazapine  (REMERON) 15 MG tablet Take 1 tablet (15 mg) by mouth At Bedtime 3/14/2017 at Unknown time Yes Weiler, Karen, MD   albuterol (2.5 MG/3ML) 0.083% nebulizer solution Take 1 vial (2.5 mg) by nebulization every 6 hours as needed for shortness of breath / dyspnea or wheezing Past Month at Unknown time Yes Weiler, Karen, MD   Multiple Vitamins-Minerals (CENTROVITE) TABS Take 1 tablet by mouth daily 3/14/2017 at Unknown time Yes Quinn Tijerina MD   VITAMIN D, CHOLECALCIFEROL, PO Take 2,000 Units by mouth daily.   3/14/2017 at Unknown time Yes Unknown, Entered By History   order for DME Equipment being ordered: Nebulizer   Weiler, Karen, MD   cyanocobalamin (VITAMIN B12) 1000 MCG/ML injection Inject 1 mL into the muscle every 30 days More than a month at Unknown time  Unknown, Entered By History   Nutritional Supplements (ENSURE) LIQD 2 cans of Ensure daily   Weiler, Karen, MD   ORDER FOR DME    Mor Conklin MD

## 2017-03-16 NOTE — LETTER
Transition Communication Hand-off for Care Transitions to Next Level of Care Provider     Name: Analia Higgins  MRN #: [id number]  Primary Care Provider: Karen Weiler  Primary Care MD Name: Dr. Weiler  Primary Clinic: [facility]  [place] [address]  Primary Care Clinic Name: [facility]  Reason for Hospitalization:  Compression fracture of L1 lumbar vertebra, closed, initial encounter (H) [S32.010A]  Admit Date/Time: 3/16/2017  8:28 PM  Discharge Date: 3/17/2017  Payor Source: Payor: MEDICA / Plan: MEDICA PRIME SOLUTION / Product Type: Indemnity /          Reason for Communication Hand-off Referral: Compression fracture    Discharge Plan: Patient discharged to home with son Matti.  Will have home care services PT/OT/SW       Concern for non-adherence with plan of care:   Y/N NO  Discharge Needs Assessment:  Needs       Most Recent Value    Equipment Currently Used at Home walker, rolling    Transportation Available family or friend will provide    # of Referrals Placed by [facility]          Already enrolled in Tele-monitoring program and name of program:  n/a  Follow-up specialty is recommended: No    Follow-up plan:  No future appointments.  Family will set up f/u appt.     Any outstanding tests or procedures:  n/a      Referrals     Future Labs/Procedures    Home care nursing referral     Comments:    RN skilled nursing visit. RN to assess vital signs and weight, pain level and activity tolerance, hydration, nutrition and bowel status and home safety.    Your provider has ordered home care nursing services. If you have not been contacted within 2 days of your discharge please call the inpatient department phone number at [phone] .    Home Care OT Referral for Hospital Discharge     Comments:    OT to eval and treat    Your provider has ordered home care - occupational therapy. If you have not been contacted within 2 days of your discharge please call the department  phone number listed on the top of this document.    Home Care PT Referral for Hospital Discharge     Comments:    PT to eval and treat    Your provider has ordered home care - physical therapy. If you have not been contacted within 2 days of your discharge please call the department phone number listed on the top of this document.            Key Recommendations:  Receive home care services and f/u up with PCP as directed.      Maggie Clinton RN, BSN, PHN, CTS  Care Transitions Specialist  [facility]    AVS/Discharge Summary is the source of truth; this is a helpful guide for improved communication of patient story

## 2017-03-16 NOTE — IP AVS SNAPSHOT
[facility]    [address]    [facility] [address]    Phone:  [phone]                                       After Visit Summary   3/16/2017    Analia Higgins    MRN: [id number]           After Visit Summary Signature Page     I have received my discharge instructions, and my questions have been answered. I have discussed any challenges I see with this plan with the nurse or doctor.    ..........................................................................................................................................  Patient/Patient Representative Signature      ..........................................................................................................................................  Patient Representative Print Name and Relationship to Patient    ..................................................               ................................................  Date                                            Time    ..........................................................................................................................................  Reviewed by Signature/Title    ...................................................              ..............................................  Date                                                            Time

## 2017-03-16 NOTE — IP AVS SNAPSHOT
MRN:[id number]                      After Visit Summary   3/16/2017    Analia Higgins    MRN: [id number]           Thank you!     Thank you for choosing [facility] for your care. Our goal is always to provide you with excellent care. Hearing back from our patients is one way we can continue to improve our services. Please take a few minutes to complete the written survey that you may receive in the mail after you visit. If you would like to speak to someone directly about your visit please contact Patient Relations at [phone]. Thank you!          Patient Information     Date Of Birth          5/26/1929        About your hospital stay     You were admitted on:  March 16, 2017 You last received care in the:  [facility] Observation Department    You were discharged on:  March 17, 2017        Reason for your hospital stay       You were admitted for concerns of nausea and uncontrolled pain. Your labs are ok and your symptoms have now improved and am taking a full meal. We walked in the room and a little in the spencer. We stopped due to fatigue but your pain was maintained at 4/10. You will be discharged with home services. For pain control, we have adjusted your home pain meds. Home services will come visit you for follow up. See your PCP in 1 week.    We also found an incidental abdominal aneurysm. It measures 3.6 x 3.5 cm abdominal aortic aneurysm. There is no need for any immediate intervention but this should be  Followed by abdominal US in 6 months. Please follow up with your PCP about this.                  Who to Call     For medical emergencies, please call 911.  For non-urgent questions about your medical care, please call your primary care provider or clinic, [phone]          Attending Provider     Provider Specialty    Fernando Donato MD Emergency Medicine    Lee Ellsworth MD Internal Medicine       Primary Care Provider Office Phone # Fax  #    Karen Weiler, [phone]       [facility] [address] [facility] [address]        After Care Instructions     Activity       Your activity upon discharge: activity as tolerated            Diet       Follow this diet upon discharge: Orders Placed This Encounter      Snacks/Supplements Adult: Ensure Plus Adult Shake; Between Meals      Regular Diet Adult                  Follow-up Appointments     Follow-up and recommended labs and tests        Follow up with primary care provider, Karen Weiler, within 7 days for hospital follow- up.  No follow up labs or test are needed.                  Additional Services     Home Care OT Referral for Hospital Discharge       OT to eval and treat    Your provider has ordered home care - occupational therapy. If you have not been contacted within 2 days of your discharge please call the department phone number listed on the top of this document.            Home Care PT Referral for Hospital Discharge       PT to eval and treat    Your provider has ordered home care - physical therapy. If you have not been contacted within 2 days of your discharge please call the department phone number listed on the top of this document.            Home care nursing referral       RN skilled nursing visit. RN to assess vital signs and weight, pain level and activity tolerance, hydration, nutrition and bowel status and home safety.    Your provider has ordered home care nursing services. If you have not been contacted within 2 days of your discharge please call the inpatient department phone number at [phone] .                   Follow-up Information     Follow up with Weiler, Karen, MD In 3 days.    Specialty:  Family Practice    Why:  As needed    Contact information:    [facility] [place]  [address] [facility] [address]  [phone]                  Further instructions from your care team         Back Fracture (Compression Fracture)  Your spine  stretches from the base of your skull to your tailbone. It's composed of 33 bones (vertebrae) stacked on top of one another. These bones are strong enough to support the weight of your upper body. Certain injuries, however, can damage one or more of the vertebrae and cause them to collapse. A collapsed bone in your spine is known as a compression fracture.    Causes of compression fracture  Many compression fractures result from osteoporosis. This disease thins your bones so they can't withstand normal pressure. Trauma from a car accident or hard fall can fracture even healthy vertebrae. In rare cases, vertebrae may fracture for unknown reasons.  When to go to the Emergency Room (ER)  Call 911 if you've been in an accident or had a fall and have neck or back pain, especially when pain occurs with any of these symptoms:    Loss of control over your bowels or bladder    Numbness or weakness    High fever    Unexplained back pain in a person with cancer  What to expect in the ER  A healthcare provider will ask about your medical history and examine you. In some cases, you may have X-rays. You also may have other tests, such as computed tomography (CT) or magnetic resonance imaging (MRI). These tests can provide detailed images of your bones and spinal cord.  Treatment  Treatment will depend on the type and cause of the fracture. You will be given medicine for pain. Severe fractures or those that cause nerve problems may need surgery. Many compression fractures mend on their own.  Follow-up  As you improve, you may be given exercises to strengthen your bones. If you have osteoporosis, your healthcare provider may prescribe a medicine to treat it. Sometimes you may have pain even after the bone has healed. In that case, your healthcare provider will discuss your choices with you.    2435-0588 [employer]. [address] [place] [address], [place] [address]. All rights reserved. This information is not intended as a  "substitute for professional medical care. Always follow your healthcare professional's instructions.          Pending Results     Date and Time Order Name Status Description    3/17/2017 1132 Vitamin B12 In process             Statement of Approval     Ordered          03/17/17 1231  I have reviewed and agree with all the recommendations and orders detailed in this document.  EFFECTIVE NOW     Approved and electronically signed by:  Valery Braswell PA-C             Admission Information     Date & Time Provider Department Dept. Phone    3/16/2017 Lee Ellsworth MD [facility] Observation Department [phone]      Your Vitals Were     Blood Pressure Temperature Respirations Height Weight Pulse Oximetry    141/69 (BP Location: Right arm) 97.9  F (36.6  C) (Oral) 16 1.575 m (5' 2\") 44 kg (97 lb) 94%    BMI (Body Mass Index)                   17.74 kg/m2           MyChart Information     [facility] gives you secure access to your electronic health record. If you see a primary care provider, you can also send messages to your care team and make appointments. If you have questions, please call your primary care clinic.  If you do not have a primary care provider, please call [phone] and they will assist you.        Care EveryWhere ID     This is your Care EveryWhere ID. This could be used by other organizations to access your [place] medical records  [id number]           Review of your medicines      START taking        Dose / Directions    HYDROcodone-acetaminophen 5-325 MG per tablet   Commonly known as:  NORCO        Dose:  1-2 tablet   Take 1-2 tablets by mouth every 6 hours as needed for moderate to severe pain   Quantity:  30 tablet   Refills:  0       ondansetron 4 MG ODT tab   Commonly known as:  ZOFRAN ODT   Used for:  Nausea        Dose:  4 mg   Take 1 tablet (4 mg) by mouth every 6 hours as needed for nausea   Quantity:  20 tablet   Refills:  0       oxyCODONE 5 MG IR tablet "   Commonly known as:  ROXICODONE        Dose:  2.5 mg   Take 0.5 tablets (2.5 mg) by mouth every 4 hours as needed for pain   Quantity:  20 tablet   Refills:  0       sennosides 8.6 MG tablet   Commonly known as:  SENOKOT        Dose:  1 tablet   Take 1 tablet by mouth 2 times daily   Quantity:  30 tablet   Refills:  1         CONTINUE these medicines which may have CHANGED, or have new prescriptions. If we are uncertain of the size of tablets/capsules you have at home, strength may be listed as something that might have changed.        Dose / Directions    acetaminophen 500 MG tablet   Commonly known as:  TYLENOL   This may have changed:  how much to take   Used for:  Compression fracture of L1 lumbar vertebra, closed, initial encounter (H)        Dose:  500 mg   Take 1 tablet (500 mg) by mouth 3 times daily   Quantity:  1 Bottle   Refills:  0         CONTINUE these medicines which have NOT CHANGED        Dose / Directions    albuterol (2.5 MG/3ML) 0.083% neb solution   Used for:  COPD (chronic obstructive pulmonary disease) (H)        Dose:  1 vial   Take 1 vial (2.5 mg) by nebulization every 6 hours as needed for shortness of breath / dyspnea or wheezing   Quantity:  75 mL   Refills:  0       aspirin 325 MG tablet        Dose:  325 mg   Take 325 mg by mouth daily   Refills:  0       CENTROVITE Tabs   Used for:  Alcohol abuse        Dose:  1 tablet   Take 1 tablet by mouth daily   Quantity:  90 tablet   Refills:  1       cyanocobalamin 1000 MCG/ML injection   Commonly known as:  VITAMIN B12        Dose:  1 mL   Inject 1 mL into the muscle every 3 months   Refills:  0       diltiazem 180 MG 24 hr capsule   Used for:  Atrial fibrillation with RVR (H)        Dose:  180 mg   Take 1 capsule (180 mg) by mouth daily Must be seen for further refills   Quantity:  90 capsule   Refills:  2       docusate sodium 100 MG capsule   Commonly known as:  COLACE   Used for:  Compression fracture of L1 lumbar vertebra, closed,  initial encounter (H)        Dose:  100 mg   Take 1 capsule (100 mg) by mouth 2 times daily   Quantity:  30 capsule   Refills:  0       donepezil 5 MG tablet   Commonly known as:  ARICEPT   Used for:  Dementia        Dose:  5 mg   Take 1 tablet (5 mg) by mouth At Bedtime   Quantity:  90 tablet   Refills:  2       ENSURE Liqd   Used for:  Poor nutrition        2 cans of Ensure daily   Quantity:  60 Can   Refills:  11       ipratropium - albuterol 0.5 mg/2.5 mg/3 mL 0.5-2.5 (3) MG/3ML neb solution   Commonly known as:  DUONEB   Used for:  Chronic obstructive pulmonary disease, unspecified COPD type (H)        Dose:  3 mL   Take 1 vial (3 mLs) by nebulization 4 times daily as needed   Quantity:  1 Box   Refills:  4       menthol 5 % Pads   Commonly known as:  ICY HOT   Used for:  Compression fracture of L1 lumbar vertebra, closed, initial encounter (H)        Dose:  1 patch   Apply 1 patch topically every 8 hours as needed for muscle soreness   Quantity:  5 patch   Refills:  0       mirtazapine 15 MG tablet   Commonly known as:  REMERON   Used for:  Primary insomnia        Dose:  15 mg   Take 1 tablet (15 mg) by mouth At Bedtime   Quantity:  90 tablet   Refills:  1       order for DME   Used for:  Fracture of humerus, proximal, right, open, Fall        Refills:  0       order for DME   Used for:  Chronic obstructive pulmonary disease, unspecified COPD type (H)        Equipment being ordered: Nebulizer   Quantity:  1 Device   Refills:  0       traZODone 50 MG tablet   Commonly known as:  DESYREL   Used for:  Chronic pain syndrome        Dose:  50 mg   Take 1 tablet (50 mg) by mouth At Bedtime   Quantity:  90 tablet   Refills:  0       VITAMIN D (CHOLECALCIFEROL) PO        Dose:  2000 Units   Take 2,000 Units by mouth daily.   Refills:  0         STOP taking     traMADol 50 MG tablet   Commonly known as:  ULTRAM                Where to get your medicines      These medications were sent to [facility] [place]  [place], [place] - [address] [facility] [place]  [address] [facility] [address]     Phone:  [phone]     acetaminophen 500 MG tablet    menthol 5 % Pads    ondansetron 4 MG ODT tab         Some of these will need a paper prescription and others can be bought over the counter. Ask your nurse if you have questions.     Bring a paper prescription for each of these medications     HYDROcodone-acetaminophen 5-325 MG per tablet    oxyCODONE 5 MG IR tablet    sennosides 8.6 MG tablet                Protect others around you: Learn how to safely use, store and throw away your medicines at www.disposemymeds.org.             Medication List: This is a list of all your medications and when to take them. Check marks below indicate your daily home schedule. Keep this list as a reference.      Medications           Morning Afternoon Evening Bedtime As Needed    acetaminophen 500 MG tablet   Commonly known as:  TYLENOL   Take 1 tablet (500 mg) by mouth 3 times daily   Last time this was given:  1,000 mg on 3/17/2017  8:09 AM                                albuterol (2.5 MG/3ML) 0.083% neb solution   Take 1 vial (2.5 mg) by nebulization every 6 hours as needed for shortness of breath / dyspnea or wheezing                                aspirin 325 MG tablet   Take 325 mg by mouth daily                                CENTROVITE Tabs   Take 1 tablet by mouth daily                                cyanocobalamin 1000 MCG/ML injection   Commonly known as:  VITAMIN B12   Inject 1 mL into the muscle every 3 months                                diltiazem 180 MG 24 hr capsule   Take 1 capsule (180 mg) by mouth daily Must be seen for further refills   Last time this was given:  180 mg on 3/17/2017  8:09 AM                                docusate sodium 100 MG capsule   Commonly known as:  COLACE   Take 1 capsule (100 mg) by mouth 2 times daily   Last time this was given:  100 mg on 3/17/2017  8:09 AM                                 donepezil 5 MG tablet   Commonly known as:  ARICEPT   Take 1 tablet (5 mg) by mouth At Bedtime   Last time this was given:  5 mg on 3/17/2017  1:35 AM                                ENSURE Liqd   2 cans of Ensure daily                                HYDROcodone-acetaminophen 5-325 MG per tablet   Commonly known as:  NORCO   Take 1-2 tablets by mouth every 6 hours as needed for moderate to severe pain   Last time this was given:  1 tablet on 3/17/2017  3:54 AM                                ipratropium - albuterol 0.5 mg/2.5 mg/3 mL 0.5-2.5 (3) MG/3ML neb solution   Commonly known as:  DUONEB   Take 1 vial (3 mLs) by nebulization 4 times daily as needed                                menthol 5 % Pads   Commonly known as:  ICY HOT   Apply 1 patch topically every 8 hours as needed for muscle soreness                                mirtazapine 15 MG tablet   Commonly known as:  REMERON   Take 1 tablet (15 mg) by mouth At Bedtime                                ondansetron 4 MG ODT tab   Commonly known as:  ZOFRAN ODT   Take 1 tablet (4 mg) by mouth every 6 hours as needed for nausea                                order for DME                                order for DME   Equipment being ordered: Nebulizer                                oxyCODONE 5 MG IR tablet   Commonly known as:  ROXICODONE   Take 0.5 tablets (2.5 mg) by mouth every 4 hours as needed for pain                                sennosides 8.6 MG tablet   Commonly known as:  SENOKOT   Take 1 tablet by mouth 2 times daily                                traZODone 50 MG tablet   Commonly known as:  DESYREL   Take 1 tablet (50 mg) by mouth At Bedtime                                VITAMIN D (CHOLECALCIFEROL) PO   Take 2,000 Units by mouth daily.

## 2017-03-16 NOTE — ED AVS SNAPSHOT
[facility] Emergency Department    [address] [place] [address]    Phone:  [phone]    Fax:  [phone]                                       Analia Higgins   MRN: [id number]    Department:  [facility] Emergency Department   Date of Visit:  3/16/2017           Patient Information     Date Of Birth          5/26/1929        Your diagnoses for this visit were:     Compression fracture of L1 lumbar vertebra, closed, initial encounter (H)        You were seen by Fernando Donato MD.      Follow-up Information     Follow up with Weiler, Karen, MD In 3 days.    Specialty:  Family Practice    Why:  As needed    Contact information:    [facility]  [address] [facility] [address]  [phone]          Discharge Instructions         Back Fracture (Compression Fracture)  Your spine stretches from the base of your skull to your tailbone. It's composed of 33 bones (vertebrae) stacked on top of one another. These bones are strong enough to support the weight of your upper body. Certain injuries, however, can damage one or more of the vertebrae and cause them to collapse. A collapsed bone in your spine is known as a compression fracture.    Causes of compression fracture  Many compression fractures result from osteoporosis. This disease thins your bones so they can't withstand normal pressure. Trauma from a car accident or hard fall can fracture even healthy vertebrae. In rare cases, vertebrae may fracture for unknown reasons.  When to go to the Emergency Room (ER)  Call 911 if you've been in an accident or had a fall and have neck or back pain, especially when pain occurs with any of these symptoms:    Loss of control over your bowels or bladder    Numbness or weakness    High fever    Unexplained back pain in a person with cancer  What to expect in the ER  A healthcare provider will ask about your medical history and examine you. In some cases,  you may have X-rays. You also may have other tests, such as computed tomography (CT) or magnetic resonance imaging (MRI). These tests can provide detailed images of your bones and spinal cord.  Treatment  Treatment will depend on the type and cause of the fracture. You will be given medicine for pain. Severe fractures or those that cause nerve problems may need surgery. Many compression fractures mend on their own.  Follow-up  As you improve, you may be given exercises to strengthen your bones. If you have osteoporosis, your healthcare provider may prescribe a medicine to treat it. Sometimes you may have pain even after the bone has healed. In that case, your healthcare provider will discuss your choices with you.    9010-1541 [employer]. [address] [place] [address], [place] [id number]. All rights reserved. This information is not intended as a substitute for professional medical care. Always follow your healthcare professional's instructions.          24 Hour Appointment Hotline       To make an appointment at any [facility], call [phone] ([phone]). If you don't have a family doctor or clinic, we will help you find one. [facility] are conveniently located to serve the needs of you and your family.             Review of your medicines      START taking        Dose / Directions Last dose taken    ondansetron 4 MG ODT tab   Commonly known as:  ZOFRAN ODT   Dose:  4 mg   Quantity:  10 tablet        Take 1 tablet (4 mg) by mouth every 6 hours as needed for nausea   Refills:  0        oxyCODONE 5 MG IR tablet   Commonly known as:  ROXICODONE   Dose:  2.5 mg   Quantity:  20 tablet        Take 0.5 tablets (2.5 mg) by mouth every 4 hours as needed for pain   Refills:  0        sennosides 8.6 MG tablet   Commonly known as:  SENOKOT   Dose:  1 tablet   Quantity:  30 tablet        Take 1 tablet by mouth 2 times daily   Refills:  1          Our records show that you are taking the medicines  listed below. If these are incorrect, please call your family doctor or clinic.        Dose / Directions Last dose taken    acetaminophen 500 MG tablet   Commonly known as:  TYLENOL   Dose:  1000 mg   Quantity:  1 Bottle        Take 2 tablets (1,000 mg) by mouth 3 times daily   Refills:  0        albuterol (2.5 MG/3ML) 0.083% neb solution   Dose:  1 vial   Quantity:  75 mL        Take 1 vial (2.5 mg) by nebulization every 6 hours as needed for shortness of breath / dyspnea or wheezing   Refills:  0        aspirin 325 MG tablet   Dose:  325 mg        Take 325 mg by mouth daily   Refills:  0        CENTROVITE Tabs   Dose:  1 tablet   Quantity:  90 tablet        Take 1 tablet by mouth daily   Refills:  1        cyanocobalamin 1000 MCG/ML injection   Commonly known as:  VITAMIN B12   Dose:  1 mL        Inject 1 mL into the muscle every 30 days   Refills:  0        diltiazem 180 MG 24 hr capsule   Dose:  180 mg   Quantity:  90 capsule        Take 1 capsule (180 mg) by mouth daily Must be seen for further refills   Refills:  2        docusate sodium 100 MG capsule   Commonly known as:  COLACE   Dose:  100 mg   Quantity:  30 capsule        Take 1 capsule (100 mg) by mouth 2 times daily   Refills:  0        donepezil 5 MG tablet   Commonly known as:  ARICEPT   Dose:  5 mg   Quantity:  90 tablet        Take 1 tablet (5 mg) by mouth At Bedtime   Refills:  2        ENSURE Liqd   Quantity:  60 Can        2 cans of Ensure daily   Refills:  11        ipratropium - albuterol 0.5 mg/2.5 mg/3 mL 0.5-2.5 (3) MG/3ML neb solution   Commonly known as:  DUONEB   Dose:  3 mL   Quantity:  1 Box        Take 1 vial (3 mLs) by nebulization 4 times daily as needed   Refills:  4        menthol 5 % Pads   Commonly known as:  ICY HOT   Dose:  1 patch   Quantity:  5 patch        Apply 1 patch topically every 8 hours as needed for muscle soreness   Refills:  0        mirtazapine 15 MG tablet   Commonly known as:  REMERON   Dose:  15 mg   Quantity:   90 tablet        Take 1 tablet (15 mg) by mouth At Bedtime   Refills:  1        order for DME        Refills:  0        order for DME   Quantity:  1 Device        Equipment being ordered: Nebulizer   Refills:  0        traMADol 50 MG tablet   Commonly known as:  ULTRAM   Dose:  25 mg   Quantity:  5 tablet        Take 0.5 tablets (25 mg) by mouth every 6 hours as needed for moderate to severe pain   Refills:  0        traZODone 50 MG tablet   Commonly known as:  DESYREL   Dose:  50 mg   Quantity:  90 tablet        Take 1 tablet (50 mg) by mouth At Bedtime   Refills:  0        VITAMIN D (CHOLECALCIFEROL) PO   Dose:  2000 Units        Take 2,000 Units by mouth daily.   Refills:  0                Prescriptions were sent or printed at these locations (3 Prescriptions)                   Other Prescriptions                Printed at Department/Unit printer (3 of 3)         oxyCODONE (ROXICODONE) 5 MG IR tablet               ondansetron (ZOFRAN ODT) 4 MG ODT tab               sennosides (SENOKOT) 8.6 MG tablet                Procedures and tests performed during your visit     Basic metabolic panel    CBC with platelets differential    CRP inflammation    Lactic acid whole blood    Peripheral IV: Standard    UA with Microscopic      Orders Needing Specimen Collection     None      Pending Results     No orders found from 3/14/2017 to 3/17/2017.            Pending Culture Results     No orders found from 3/14/2017 to 3/17/2017.             Test Results from your hospital stay     3/16/2017  9:01 PM - Interface, GL 2ours Results      Component Results     Component Value Ref Range & Units Status    WBC 11.6 (H) 4.0 - 11.0 10e9/L Final    RBC Count 4.22 3.8 - 5.2 10e12/L Final    Hemoglobin 13.6 11.7 - 15.7 g/dL Final    Hematocrit 39.2 35.0 - 47.0 % Final    MCV 93 78 - 100 fl Final    MCH 32.2 26.5 - 33.0 pg Final    MCHC 34.7 31.5 - 36.5 g/dL Final    RDW 12.4 10.0 - 15.0 % Final    Platelet Count 363 150 - 450 10e9/L Final     Diff Method Automated Method  Final    % Neutrophils 71.9 % Final    % Lymphocytes 16.1 % Final    % Monocytes 9.2 % Final    % Eosinophils 1.6 % Final    % Basophils 0.7 % Final    % Immature Granulocytes 0.5 % Final    Nucleated RBCs 0 0 /100 Final    Absolute Neutrophil 8.3 1.6 - 8.3 10e9/L Final    Absolute Lymphocytes 1.9 0.8 - 5.3 10e9/L Final    Absolute Monocytes 1.1 0.0 - 1.3 10e9/L Final    Absolute Eosinophils 0.2 0.0 - 0.7 10e9/L Final    Absolute Basophils 0.1 0.0 - 0.2 10e9/L Final    Abs Immature Granulocytes 0.1 0 - 0.4 10e9/L Final    Absolute Nucleated RBC 0.0  Final         3/16/2017  9:16 PM - Interface, Flexilab Results      Component Results     Component Value Ref Range & Units Status    Sodium 134 133 - 144 mmol/L Final    Potassium 3.6 3.4 - 5.3 mmol/L Final    Chloride 98 94 - 109 mmol/L Final    Carbon Dioxide 26 20 - 32 mmol/L Final    Anion Gap 10 3 - 14 mmol/L Final    Glucose 110 (H) 70 - 99 mg/dL Final    Urea Nitrogen 17 7 - 30 mg/dL Final    Creatinine 0.69 0.52 - 1.04 mg/dL Final    GFR Estimate 81 >60 mL/min/1.7m2 Final    Non  GFR Calc    GFR Estimate If Black >90   GFR Calc   >60 mL/min/1.7m2 Final    Calcium 8.9 8.5 - 10.1 mg/dL Final         3/16/2017  9:01 PM - Interface, Flexilab Results      Component Results     Component Value Ref Range & Units Status    Lactic Acid 0.9 0.7 - 2.1 mmol/L Final         3/16/2017  9:16 PM - Interface, Flexilab Results      Component Results     Component Value Ref Range & Units Status    CRP Inflammation 27.8 (H) 0.0 - 8.0 mg/L Final         3/16/2017 10:01 PM - Interface, Flexilab Results      Component Results     Component Value Ref Range & Units Status    Color Urine Yellow  Final    Appearance Urine Slightly Cloudy  Final    Glucose Urine Negative NEG mg/dL Final    Bilirubin Urine Negative NEG Final    Ketones Urine 5 (A) NEG mg/dL Final    Specific Gravity Urine 1.018 1.003 - 1.035 Final    Blood  Urine Negative NEG Final    pH Urine 5.0 5.0 - 7.0 pH Final    Protein Albumin Urine 30 (A) NEG mg/dL Final    Urobilinogen mg/dL 0.0 0.0 - 2.0 mg/dL Final    Nitrite Urine Negative NEG Final    Leukocyte Esterase Urine Negative NEG Final    Source Midstream Urine  Final    WBC Urine 1 0 - 2 /HPF Final    RBC Urine 2 0 - 2 /HPF Final    Bacteria Urine Few (A) NEG /HPF Final    Squamous Epithelial /HPF Urine 6 (H) 0 - 1 /HPF Final    Mucous Urine Present (A) NEG /LPF Final                Clinical Quality Measure: Blood Pressure Screening     Your blood pressure was checked while you were in the emergency department today. The last reading we obtained was  BP: 149/81 . Please read the guidelines below about what these numbers mean and what you should do about them.  If your systolic blood pressure (the top number) is less than 120 and your diastolic blood pressure (the bottom number) is less than 80, then your blood pressure is normal. There is nothing more that you need to do about it.  If your systolic blood pressure (the top number) is 120-139 or your diastolic blood pressure (the bottom number) is 80-89, your blood pressure may be higher than it should be. You should have your blood pressure rechecked within a year by a primary care provider.  If your systolic blood pressure (the top number) is 140 or greater or your diastolic blood pressure (the bottom number) is 90 or greater, you may have high blood pressure. High blood pressure is treatable, but if left untreated over time it can put you at risk for heart attack, stroke, or kidney failure. You should have your blood pressure rechecked by a primary care provider within the next 4 weeks.  If your provider in the emergency department today gave you specific instructions to follow-up with your doctor or provider even sooner than that, you should follow that instruction and not wait for up to 4 weeks for your follow-up visit.        Thank you for choosing [facility]        Thank you for choosing [facility] for your care. Our goal is always to provide you with excellent care. Hearing back from our patients is one way we can continue to improve our services. Please take a few minutes to complete the written survey that you may receive in the mail after you visit with us. Thank you!        [employer] Information     [employer] gives you secure access to your electronic health record. If you see a primary care provider, you can also send messages to your care team and make appointments. If you have questions, please call your primary care clinic.  If you do not have a primary care provider, please call [phone] and they will assist you.        Care EveryWhere ID     This is your Care EveryWhere ID. This could be used by other organizations to access your [facility] medical records  [id number]        After Visit Summary       This is your record. Keep this with you and show to your community pharmacist(s) and doctor(s) at your next visit.

## 2017-03-16 NOTE — TELEPHONE ENCOUNTER
Reason for call:  Patient reporting a symptom    Symptom or request: mom is out of hospital but cant move. Back pain is bad. Not sure if should bring to ED?    Duration (how long have symptoms been present): since yesterday    Have you been treated for this before? Yes    Additional comments: needs advice on what do to, cant function on own(grab a glass for water)    Phone Number patient can be reached at:  Cell number on file:    Telephone Information:   Mobile [phone]       Best Time:  asap    Can we leave a detailed message on this number:  YES    Call taken on 3/16/2017 at 11:43 AM by Chaya Keita

## 2017-03-16 NOTE — TELEPHONE ENCOUNTER
"Patient's son, Matti calling back:    ED/Discharge Protocol    \"Hi, my name is Ginger Jackson, a registered nurse, and I am calling on behalf of Dr. Weiler's office at [facility].  I am calling to follow up and see how things are going for you after your recent visit.\"    \"I see that you were in the (ER/UC/IP) on 3/15/17.    How are you doing now that you are home?\" Patient is reporting significant back pain since discharge from hospital yesterday. She cannot get around, not eating. Home care is set up for tomorrow. Threw up after taking Tylenol and tramadol    Is patient experiencing symptoms that may require a hospital visit?  To be determined    Discharge Instructions    \"Let's review your discharge instructions.  What is/are the follow-up recommendations?  Pt. Response: Follow up with primary care physician    \"Were you instructed to make a follow-up appointment?\"  Pt. Response: Yes.  Has appointment been made?   No.    \"When you see the provider, I would recommend that you bring your discharge instructions with you.    Medications    \"How many new medications are you on since your hospitalization/ED visit?\"    0-1  \"How many of your current medicines changed (dose, timing, name, etc.) while you were in the hospital/ED visit?\"   0-1  \"Do you have questions about your medications?\"   No  \"Were you newly diagnosed with heart failure, COPD, diabetes or did you have a heart attack?\"   No  For patients on insulin: \"Did you start on insulin in the hospital or did you have your insulin dose changed?\"   No    Medication reconciliation completed? Yes    Was MTM referral placed (*Make sure to put transitions as reason for referral)?   No    Call Summary    \"Do you have any questions or concerns about your condition or care plan at the moment?\"    Yes - Matti is very concerned about patient and does not think she can wait until tomorrow for home care. He is wondering about transitional care or even bringing her back to the " hospital  Message handled by Nurse Triage with [facility] - provider name: Karen Weiler, MD.Unfortunately, we are not able to assist with getting patient into transitional care, however Care Coordination or Home Care can do this for them, but this will likely not be able to get done today. Patient should be evaluated in ER again given level of pain and inability to eat or drink.     Advised Bill of MD PENNY's recommendation to return patient to ER given symptoms.    Bill verbalized understanding and agrees with plan.    Will call back if further questions or concerns.    Ginger Jackson RN, BSN

## 2017-03-16 NOTE — TELEPHONE ENCOUNTER
Patient discharged from [facility] for [facility] on 3/15/2017.    Please contact patient for follow up; no appointment scheduled at this time.    ER / IP visits: 0 / 0    Care Coordination: TREMAYNE Barbosa  -[facility]

## 2017-03-16 NOTE — ED AVS SNAPSHOT
[facility] Emergency Department    [address]    [facility] [address]    Phone:  [phone]    Fax:  [phone]                                       Analia Higgins   MRN: [id number]    Department:  [facility] Emergency Department   Date of Visit:  3/16/2017           After Visit Summary Signature Page     I have received my discharge instructions, and my questions have been answered. I have discussed any challenges I see with this plan with the nurse or doctor.    ..........................................................................................................................................  Patient/Patient Representative Signature      ..........................................................................................................................................  Patient Representative Print Name and Relationship to Patient    ..................................................               ................................................  Date                                            Time    ..........................................................................................................................................  Reviewed by Signature/Title    ...................................................              ..............................................  Date                                                            Time

## 2017-03-17 NOTE — DISCHARGE SUMMARY
[facility]  Outpatient/Observation Unit  Discharge Summary        Patient ID:  Analia Higgins  [id number]  87 year old  5/26/1929    Admit date: 3/16/2017    Discharge date and time: 3/17/2017     Admitting Provider: Lee Ellsworth MD    Discharge Provider: Valery WYATTC    Admission Diagnoses:  Compression fracture of L1 lumbar vertebra, closed, initial encounter (H) [S32.010A]    Discharge Diagnoses: same  Episode of nausea and vomiting, likely Tramadol related. Resolved.     Admission Condition: good    Discharged Condition: good    Hospital Course:    Reason for your hospital stay       You were admitted for concerns of nausea and uncontrolled pain. Your labs   are ok and your symptoms have now improved and am taking a full meal. We   walked in the room and a little in the spencer. We stopped due to fatigue but   your pain was maintained at 4/10. You will be discharged with home   services. For pain control, we have adjusted your home pain meds. Home   services will come visit you for follow up. See your PCP in 1 week.    We also found an incidental abdominal aneurysm. It measures 3.6 x 3.5 cm   abdominal aortic aneurysm. There is no need for any immediate intervention   but this should be  Followed by abdominal US in 6 months. Please follow up   with your PCP about this.        Consults: SW    Significant Diagnostic Studies:     Recent Labs  Lab 03/16/17  2051 03/14/17  2200   WBC 11.6* 13.1*   HGB 13.6 13.5   HCT 39.2 38.7   MCV 93 93    411          Lab Results   Component Value Date     03/16/2017     03/14/2017     09/09/2016    Lab Results   Component Value Date    CHLORIDE 98 03/16/2017    CHLORIDE 97 03/14/2017    CHLORIDE 100 09/09/2016    Lab Results   Component Value Date    BUN 17 03/16/2017    BUN 19 03/14/2017    BUN 14 09/09/2016      Lab Results   Component Value Date    POTASSIUM 3.6 03/16/2017    POTASSIUM 3.6 03/14/2017    POTASSIUM 4.1  09/09/2016    Lab Results   Component Value Date    CO2 26 03/16/2017    CO2 25 03/14/2017    CO2 26 09/09/2016    Lab Results   Component Value Date    CR 0.69 03/16/2017    CR 0.72 03/14/2017    CR 0.84 09/09/2016        No results for input(s): CULT in the last 168 hours.  No results for input(s): TSH in the last 168 hours.    Recent Labs  Lab 03/14/17  2200   TROPI <0.015The 99th percentile for upper reference range is 0.045 ug/L.  Troponin values in the range of 0.045 - 0.120 ug/L may be associated with risks of adverse clinical events.       Recent Labs  Lab 03/16/17  2144   COLOR Yellow   APPEARANCE Slightly Cloudy   URINEGLC Negative   URINEBILI Negative   URINEKETONE 5*   SG 1.018   UBLD Negative   URINEPH 5.0   PROTEIN 30*   NITRITE Negative   LEUKEST Negative   RBCU 2   WBCU 1       Results for orders placed or performed during the hospital encounter of 03/14/17   Head CT w/o contrast    Narrative    CT HEAD WITHOUT CONTRAST  3/14/2017 11:59 PM     HISTORY: Unwitnessed fall.    COMPARISON: 1/9/2015.    TECHNIQUE: Without intravenous contrast, helical sections were  acquired through the brain. Coronal reconstructions were generated.  Radiation dose for this scan was reduced using automated exposure  control, adjustment of the mA and/or kV according to the patient's  size, or iterative reconstruction technique.    FINDINGS: Moderate diffuse cerebral atrophy. Moderate bilateral  periventricular white matter low attenuation, likely relating to  chronic small vessel ischemic disease. Encephalomalacia in the left  occipital lobe. No intra-axial mass, mass affect or midline shift.  Normal gray-white matter differentiation. No visualized acute  intra-axial hemorrhage. The cerebral ventricles are normal in caliber.  The basal cisterns are patent. No extra-axial fluid collection. The  visualized portions of the paranasal sinuses and mastoid air cells are  unremarkable.      Impression    IMPRESSION: No evidence of  acute intracranial abnormality.    KULWANT LAMBERT MD   Abd/pelvis CT,  IV  contrast only TRAUMA / AAA    Narrative    CT ABDOMEN AND PELVIS WITH CONTRAST  3/15/2017 12:16 AM     HISTORY: Fall. Lower back pain.    COMPARISON: 5/22/2009.    TECHNIQUE: Note that this study was performed in conjunction with a CT  scan of the lumbar spine. Refer to a separate report for findings from  that study. Following the uneventful administration of 50 mL  Isovue-370 intravenous contrast, helical sections were acquired from  the top of the diaphragm through the pubic symphysis. Coronal  reconstructions were generated. Radiation dose for this scan was  reduced using automated exposure control, adjustment of the mA and/or  kV according to the patient's size, or iterative reconstruction  technique.    FINDINGS:     Abdomen: The liver, spleen, pancreas and adrenal glands are  unremarkable. Mildly dilated right intrarenal collecting system and  extrarenal pelvis with normal caliber ureter again noted, suggestive  of a chronic ureteropelvic junction obstruction. Symmetrical  enhancement to both kidneys. A few nonobstructing calculi in the right  renal pelvis, the largest measuring 0.9 cm. Two tiny less than 0.3 cm  diameter nonobstructing left renal calculi. Prior cholecystectomy. 3.6  x 3.5 cm abdominal aortic aneurysm, increased in caliber since  5/22/2009 when the abdominal aorta measured 2.4 x 2.3 cm.  Atherosclerotic calcification in the abdominal aorta. No enlarged  lymph nodes or free fluid in the upper abdomen.    Scan through the lower chest is significant for coronary artery  calcification.    Pelvis: The small and large bowel are normal in caliber. Several  diverticula are present in the colon without evidence of  diverticulitis. The appendix is not visualized. No bowel wall  thickening, pneumatosis or free intraperitoneal gas. The uterus is not  visualized. No enlarged lymph nodes or free fluid in the pelvis.  Partial  visualization of a right hip arthroplasty. Surgical screws are  present in the left femoral head and neck. Acute mild compression  fracture of the superior endplate of the L1 vertebral body.      Impression    IMPRESSION:   1. Acute mild compression fracture of the superior endplate of the L1  vertebral body.  2. No other cause of acute pain identified in the abdomen or pelvis.  3. 3.6 x 3.5 cm abdominal aortic aneurysm. On the comparison study  dated 5/22/2009, the abdominal aorta measured 2.4 x 2.3 cm.  4. 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 There are a few nonobstructing calculi in both kidneys.  5. Colonic diverticulosis without evidence of diverticulitis.    KULWANT LAMBERT MD   Lumbar spine CT w/o contrast    Narrative    CT LUMBAR SPINE  3/15/2017 12:16 AM     HISTORY: Fall. Low back pain.    COMPARISON: None.    TECHNIQUE: Note that this study was performed in conjunction with a CT  scan of the abdomen and pelvis. Refer to a separate report for  findings from that study. Helical sections were acquired through the  lumbar spine. Coronal and sagittal reconstructions were generated.  Radiation dose for this scan was reduced using automated exposure  control, adjustment of the mA and/or kV according to the patient's  size, or iterative reconstruction technique.      Impression    IMPRESSION:   1. Acute mild compression fracture of the superior endplate of the L1  vertebral body.  2. No other visualized acute fractures of the lumbar spine.  3. Straightening of the normal lumbar lordosis, otherwise normal  alignment of the lumbar spine.  4. Moderate to severe degenerative changes throughout the lumbar  spine.  5. Partial visualization of a 3.5 cm abdominal aortic aneurysm.  6. A few nonobstructing bilateral renal calculi.    KULWANT LAMBERT MD   XR Chest 2 Views    Narrative    CHEST 2 VIEWS  3/15/2017 12:27 AM     HISTORY: Chest pain and shortness of breath.    COMPARISON:  9/22/2016.    FINDINGS: Hyperinflated lungs. The lungs are clear. Mild cardiomegaly.  Atherosclerotic calcification in the thoracic aorta. Partial  visualization of surgical hardware in the proximal right humerus.      Impression    IMPRESSION:   1. No convincing evidence of active cardiopulmonary disease.  2. Hyperinflated lungs, suggestive of chronic obstructive pulmonary  disease.    KULWANT LAMBERT MD       Treatments: IV hydration, pain med adjustments    Discharge Exam:    B/P: 141/69, T: 97.9, P: Data Unavailable, R: 16    GENERAL:  Comfortable.  PSYCH: pleasant, oriented, No acute distress.  HEENT:  PERRLA. Normal conjunctiva, normal hearing, nasal mucosa and Oropharynx are normal.  NECK:  Supple, no neck vein distention, adenopathy or bruits, normal thyroid.  HEART:  Normal S1, S2 with no murmur, no pericardial rub, gallops or S3 or S4.  LUNGS:  Clear to auscultation, normal Respiratory effort. No wheezing, rales or ronchi.  ABDOMEN:  Soft, no hepatosplenomegaly, normal bowel sounds. Non-tender, non distended. No pulsatile mass appreciated   EXTREMITIES:  No pedal edema, +2 pulses bilateral and equal.  SKIN:  Dry to touch, No rash, wound or ulcerations.  NEUROLOGIC:  CN 2-12 intact, BL 5/5 symmetric upper and lower extremity strength, sensation is intact with no focal deficits.     Pending Studies:    Unresulted Labs Ordered in the Past 30 Days of this Admission     No orders found from 1/15/2017 to 3/17/2017.          Disposition: home    Patient Instructions:        Review of your medicines      START taking       Dose / Directions    HYDROcodone-acetaminophen 5-325 MG per tablet   Commonly known as:  NORCO   Used for:  Compression fracture        Dose:  1-2 tablet   Take 1-2 tablets by mouth every 6 hours as needed for moderate to severe pain   Quantity:  30 tablet   Refills:  0       ondansetron 4 MG ODT tab   Commonly known as:  ZOFRAN ODT   Used for:  Nausea        Dose:  4 mg   Take 1 tablet (4 mg)  by mouth every 6 hours as needed for nausea   Quantity:  20 tablet   Refills:  0       oxyCODONE 5 MG IR tablet   Commonly known as:  ROXICODONE        Dose:  2.5 mg   Take 0.5 tablets (2.5 mg) by mouth every 4 hours as needed for pain   Quantity:  20 tablet   Refills:  0       sennosides 8.6 MG tablet   Commonly known as:  SENOKOT        Dose:  1 tablet   Take 1 tablet by mouth 2 times daily   Quantity:  30 tablet   Refills:  1         CONTINUE these medicines which may have CHANGED, or have new prescriptions. If we are uncertain of the size of tablets/capsules you have at home, strength may be listed as something that might have changed.       Dose / Directions    acetaminophen 500 MG tablet   Commonly known as:  TYLENOL   This may have changed:  how much to take   Used for:  Compression fracture of L1 lumbar vertebra, closed, initial encounter (H)        Dose:  500 mg   Take 1 tablet (500 mg) by mouth 3 times daily   Quantity:  1 Bottle   Refills:  0         CONTINUE these medicines which have NOT CHANGED       Dose / Directions    albuterol (2.5 MG/3ML) 0.083% neb solution   Used for:  COPD (chronic obstructive pulmonary disease) (H)        Dose:  1 vial   Take 1 vial (2.5 mg) by nebulization every 6 hours as needed for shortness of breath / dyspnea or wheezing   Quantity:  75 mL   Refills:  0       aspirin 325 MG tablet        Dose:  325 mg   Take 325 mg by mouth daily   Refills:  0       CENTROVITE Tabs   Used for:  Alcohol abuse        Dose:  1 tablet   Take 1 tablet by mouth daily   Quantity:  90 tablet   Refills:  1       cyanocobalamin 1000 MCG/ML injection   Commonly known as:  VITAMIN B12        Dose:  1 mL   Inject 1 mL into the muscle every 3 months   Refills:  0       diltiazem 180 MG 24 hr capsule   Used for:  Atrial fibrillation with RVR (H)        Dose:  180 mg   Take 1 capsule (180 mg) by mouth daily Must be seen for further refills   Quantity:  90 capsule   Refills:  2       docusate sodium 100  MG capsule   Commonly known as:  COLACE   Used for:  Compression fracture of L1 lumbar vertebra, closed, initial encounter (H)        Dose:  100 mg   Take 1 capsule (100 mg) by mouth 2 times daily   Quantity:  30 capsule   Refills:  0       donepezil 5 MG tablet   Commonly known as:  ARICEPT   Used for:  Dementia        Dose:  5 mg   Take 1 tablet (5 mg) by mouth At Bedtime   Quantity:  90 tablet   Refills:  2       ENSURE Liqd   Used for:  Poor nutrition        2 cans of Ensure daily   Quantity:  60 Can   Refills:  11       ipratropium - albuterol 0.5 mg/2.5 mg/3 mL 0.5-2.5 (3) MG/3ML neb solution   Commonly known as:  DUONEB   Used for:  Chronic obstructive pulmonary disease, unspecified COPD type (H)        Dose:  3 mL   Take 1 vial (3 mLs) by nebulization 4 times daily as needed   Quantity:  1 Box   Refills:  4       menthol 5 % Pads   Commonly known as:  ICY HOT   Used for:  Compression fracture of L1 lumbar vertebra, closed, initial encounter (H)        Dose:  1 patch   Apply 1 patch topically every 8 hours as needed for muscle soreness   Quantity:  5 patch   Refills:  0       mirtazapine 15 MG tablet   Commonly known as:  REMERON   Used for:  Primary insomnia        Dose:  15 mg   Take 1 tablet (15 mg) by mouth At Bedtime   Quantity:  90 tablet   Refills:  1       order for DME   Used for:  Fracture of humerus, proximal, right, open, Fall        Refills:  0       order for DME   Used for:  Chronic obstructive pulmonary disease, unspecified COPD type (H)        Equipment being ordered: Nebulizer   Quantity:  1 Device   Refills:  0       traZODone 50 MG tablet   Commonly known as:  DESYREL   Used for:  Chronic pain syndrome        Dose:  50 mg   Take 1 tablet (50 mg) by mouth At Bedtime   Quantity:  90 tablet   Refills:  0       VITAMIN D (CHOLECALCIFEROL) PO        Dose:  2000 Units   Take 2,000 Units by mouth daily.   Refills:  0         STOP taking          traMADol 50 MG tablet   Commonly known as:  ULTRAM                 Where to get your medicines      These medications were sent to [place], [place] - [address] [facility]  [address] [facility] [address]     Phone:  [phone]      acetaminophen 500 MG tablet     menthol 5 % Pads     ondansetron 4 MG ODT tab         Some of these will need a paper prescription and others can be bought over the counter. Ask your nurse if you have questions.     Bring a paper prescription for each of these medications      HYDROcodone-acetaminophen 5-325 MG per tablet     oxyCODONE 5 MG IR tablet     sennosides 8.6 MG tablet                 Activity: activity as tolerated with walker. Pt is not consistent with usage of walker.     Active Diet Order      Diet    Follow-up with PCP in 1 week.    Signed:  Valery Braswell

## 2017-03-17 NOTE — ED NOTES
Bed: ED12  Expected date: 3/16/17  Expected time: 8:19 PM  Means of arrival: Ambulance  Comments:  Lenny Calvin

## 2017-03-17 NOTE — ED NOTES
obs samantha seen on 14 pt alert lives with son walks with walker able to put call light on for assistance

## 2017-03-17 NOTE — ED PROVIDER NOTES
"  History     Chief Complaint:  Back Pain    HPI   Analia Higgins is a 87 year old female who presents to the emergency department today via EMS for evaluation of back pain. Per chart review, the patient was seen and evaluated in the ED two day ago following a fall. At this time, imaging demonstrated a L1 compression fracture and she was subsequently admitted to the hospital. Radiology reads as per below. She was discharged yesterday to take tylenol for pain management. However, daughter reports the patient has been unable to walk or go to the bathroom by herself and one episode of vomiting. Patient has only been taking tylenol for pain, but states she was \"in agony,\" therefore EMS was called and she presents back to the ED for further evaluation. Upon presentation, she reports right lower quadrant abdominal pain that she describes as \"my ovaries hurt.\" Her last dose of tylenol and tramadol was at 1700 tonight. Patient's son feels he is unable to care for the patient appropriately.     [facility] Emergency Department - 3/14/17  X-ray Chest, 2 views:   1. No convincing evidence of active cardiopulmonary disease.  2. Hyperinflated lungs, suggestive of chronic obstructive pulmonary  disease.  Preliminary radiology read.       Lumbar spine CT without contrast:  1. Acute mild compression fracture of the superior endplate of the L1  vertebral body.  2. No other visualized acute fractures of the lumbar spine.  3. Straightening of the normal lumbar lordosis, otherwise normal  alignment of the lumbar spine.  4. Moderate to severe degenerative changes throughout the lumbar  spine.  5. Partial visualization of a 3.5 cm abdominal aortic aneurysm.  6. A few nonobstructing bilateral renal calculi.  Preliminary radiology read.       Abd/Pelvis CT, IV contrast only, TRAUMA AAA:  1. Acute mild compression fracture of the superior endplate of the L1  vertebral body.  2. No other cause of acute pain identified in the " abdomen or pelvis.  3. 3.6 x 3.5 cm abdominal aortic aneurysm, increased in caliber since  5/22/2009 when it measured 2.4 x 2.3 cm.  4. 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 There are a few nonobstructing calculi in both kidneys.  5. Colonic diverticulosis without evidence of diverticulitis  Preliminary radiology read.      Head CT without contrast:   No evidence of acute intracranial abnormality.  Preliminary radiology read.     Allergies:  Codeine     Medications:    Hydromorphone (dilaudid) 2 mg tablet  Donepezil (aricept) 5 mg tablet  Trazodone (desyrel) 50 mg tablet  Ipratropium - albuterol 0.5 mg/2.5 mg/3 ml (duoneb) 0.5-2.5 (3) mg/3ml nebulization  Diltiazem 180 mg 24 hr cd capsule  Mirtazapine (remeron) 15 mg tablet  Cyanocobalamin (vitamin b12) 1000 mcg/ml injection  Cyanocobalamin (vitamin b12) 1000 mcg/ml injection  Albuterol (2.5 mg/3ml) 0.083% nebulizer solution  Aspirin 81 mg ec tablet  Acetaminophen (tylenol) 500 mg tablet  Cyanocobalamin (vitamin b12) 1000 mcg/ml injection  Multiple vitamins-minerals (centrovite) tabs  Nutritional supplements (ensure) liqd  Vitamin d, cholecalciferol, po     Past Medical History:    Acute alcoholic hepatitis  Alcoholic cirrhosis of liver (H)  Closed fracture of unspecified part of humerus  CVA (cerebral infarction)  CVA (cerebral vascular accident) (H)  Kidney stone  Normal delivery  Pneumonia, organism unspecified  Sciatica  Tobacco use disorder  Unspecified disorder of lipoid metabolism  Need for prophylactic vaccination with tetanus-diphtheria (TD)  Atrial fibrillation with RVR (H)  Acute gastroenteritis  New onset atrial fibrillation (H)  Carotid stenosis  COPD (chronic obstructive pulmonary disease) (H)  Open fracture of part of upper end of humerus  Fracture of left hip (H)     Past Surgical History:   Vaginal hysterectomy  Lap,cholecystectomy/graph  Cataract iol, rt/lt  Open reduction internal fixation acetabulum left  hip  Open reduction internal fixation humerus proximal  Right hip arthroplasty    Family History:    CAD  Breast Cancer  Asthma     Social History:  The patient was accompanied to the ED by her daughter.  Smoking Status: Current  Smokeless Tobacco: Never  Alcohol Use: Yes  Marital Status:        Review of Systems   Constitutional: Negative for fever.   Gastrointestinal: Positive for abdominal pain and vomiting. Negative for nausea.   Musculoskeletal: Positive for back pain and gait problem.   All other systems reviewed and are negative.    Physical Exam   First Vitals:  /81  Temp 97.9  F (36.6  C) (Oral)  Resp 18  SpO2 100%    Physical Exam   Constitutional: She is oriented to person, place, and time.   Elderly frail cachectic appearing   HENT:   Head: Normocephalic and atraumatic.   Right Ear: External ear normal.   Mouth/Throat: Oropharynx is clear and moist.   Eyes: Conjunctivae and EOM are normal. Pupils are equal, round, and reactive to light.   Neck: Normal range of motion. Neck supple. No JVD present.   Cardiovascular: Normal rate, regular rhythm and normal heart sounds.    Pulmonary/Chest: Effort normal and breath sounds normal.   Abdominal: Soft. Bowel sounds are normal. She exhibits no distension. There is no tenderness. There is no rebound.   Musculoskeletal: Normal range of motion.        Back:    Lymphadenopathy:     She has no cervical adenopathy.   Neurological: She is alert and oriented to person, place, and time. She displays normal reflexes. No cranial nerve deficit. She exhibits normal muscle tone. Coordination normal. GCS eye subscore is 4. GCS verbal subscore is 4. GCS motor subscore is 6.   Skin: Skin is warm and dry.   Psychiatric: She has a normal mood and affect. Her behavior is normal. Judgment normal.   Nursing note and vitals reviewed.      Emergency Department Course     Laboratory:  Laboratory findings were communicated with the patient and family who voiced understanding  of the findings.  CBC: WBC 11.6(H) o/w WNL. (HGB 13.6, )   BMP: Glucose 110(H) o/w WNL (Creatinine 0.69)  Lactic Acid: 0.9  CRP inflammation: 27.8(H)  UA: urineketon 5, protein albumin 30, few bacteria, squamous epithelial 6 (H), mucous present o/w WNL    Interventions:  2059 NS 1,000mL IV  2059 Zofran 4mg IV  2059 Dilaudid 0.2mg IV     Emergency Department Course:  Nursing notes and vitals reviewed.  The patient provided a urine sample here in the emergency department. This was sent for laboratory testing, findings above.  IV was inserted and blood was drawn for laboratory testing, results above.  2045: I performed an exam of the patient as documented above.   2113: I spoke with the patient's daughter.   2250: Patient rechecked and updated on the laboratory results.   2310: Patient unable to ambulate without pain, therefore plan for admission.   I spoke with Dr. Ellsworth of the hospitalist service regarding patient's presentation, findings, and plan of care.  I discussed the treatment plan with the patient. They expressed understanding of this plan and consented to admission. I discussed the patient with Dr. Ellsworth, who will admit the patient to a monitored bed for further evaluation and treatment.  I personally reviewed the laboratory results with the Patient and answered all related questions prior to admission.     Impression & Plan      Medical Decision Making:  Patient presents with back pain with radiation to the lower abdomen. Patient was seen here yesterday with extensive work up including CT of the abdomen that showed no cleared findings except for new L1 fracture. Patient was admitted for a new L1 fracture. Patient was discharged without pain medication. Repeat lab tests are normal, even though CRP is slightly elevated I do not feel patient requires repeat CT scan. The patient was discussed with these findings. Patient was ultimately going to go home, but when she was going to get up she felt that  she had too much pain to go home and then requested admission. I am concerned about mild dementia and her memory issues. This patient's pain complains seem to wax and wane. Regardless, we will admit to the hospital for ongoing care.     Diagnosis:    ICD-10-CM    1. Compression fracture of L1 lumbar vertebra, closed, initial encounter (H) S32.010A    2. Intractable low back pain     Disposition:   Admitted to obs under the supervision of Dr. Jodee Bryson Disclosure:  I, Liz Garcia, am serving as a scribe at 8:31 PM on 3/16/2017 to document services personally performed by Fernando Donato MD, based on my observations and the provider's statements to me.    3/16/2017   [facility] EMERGENCY DEPARTMENT       Fernando Donato MD  03/20/17 0724

## 2017-03-17 NOTE — PLAN OF CARE
Problem: Discharge Planning  Goal: Discharge Planning (Adult, OB, Behavioral, Peds)  Outcome: Adequate for Discharge Date Met:  03/17/17  Patient's After Visit Summary was reviewed with patient and/or family-daughter  Patient verbalized understanding of After Visit Summary, recommended follow up and was given an opportunity to ask questions.   Discharge medications sent home with patient/family: YES, zofran and norco   Discharged with daughter, left unit via wheelchair.

## 2017-03-17 NOTE — PLAN OF CARE
Problem: Discharge Planning  Goal: Discharge Planning (Adult, OB, Behavioral, Peds)  Outcome: Improving  PRIMARY DIAGNOSIS: Acute Traumatic Pain  OUTPATIENT/OBSERVATION GOALS TO BE MET BEFORE DISCHARGE:     1. Tolerate PO Intake: Yes  2. Cleared for discharge from consultants involved: Yes  3. ADLs back to baseline?  Yes  4. Activity and level of assistance: Up with standby assistance.  5. Pain status: Improved-controlled with oral pain medications.  6. Barriers to discharge noted No     Discharge order placed for patient. Waiting for family to arrive to discuss DC orders and medications and pain regimen.

## 2017-03-17 NOTE — PLAN OF CARE
Problem: Discharge Planning  Goal: Discharge Planning (Adult, OB, Behavioral, Peds)  Outcome: No Change  PRIMARY DIAGNOSIS: Acute Traumatic Pain  OUTPATIENT/OBSERVATION GOALS TO BE MET BEFORE DISCHARGE:     1. Tolerate PO Intake: Yes  2. Cleared for discharge from consultants involved:   3. ADLs back to baseline?  No  4. Activity and level of assistance: Up with A-1 assistance.     5. Pain status: c/o pain in low back prn norco given  6. Barriers to discharge noted Yes      Patient is alert and oriented to self and situation. Forgetful. A-1 with ambulation to the bathroom. C/o pian in low mid back, prn norco given. Denies nausea. IV site saline locked. VPM machine in room. Bed alarm set for safety. Will continue to monitor, assess, and offer supportive cares.

## 2017-03-17 NOTE — PLAN OF CARE
Problem: Discharge Planning  Goal: Discharge Planning (Adult, OB, Behavioral, Peds)  Outcome: Improving  PRIMARY DIAGNOSIS: Acute Traumatic Pain  OUTPATIENT/OBSERVATION GOALS TO BE MET BEFORE DISCHARGE:     1. Tolerate PO Intake: Yes  2. Cleared for discharge from consultants involved:   3. ADLs back to baseline?  No  4. Activity and level of assistance: Up with A-1 assistance.     5. Pain status: Currently denies back pain, reports having pain in her ovaries.  6. Barriers to discharge noted Yes      Patient is alert and oriented to self and situation. Forgetful. A-1 with ambulation to the bathroom. Patient currently denies back pain while in bed, reports having pain in her ovaries. Denies nausea. IV site saline locked. Small scratches on her arms, legs, and chest noted. Slight redness to coccyx area. No open sore noted. VPM machine in room. Bed alarm set for safety. Will continue to monitor, assess, and offer supportive cares.

## 2017-03-17 NOTE — DISCHARGE INSTRUCTIONS
Back Fracture (Compression Fracture)  Your spine stretches from the base of your skull to your tailbone. It's composed of 33 bones (vertebrae) stacked on top of one another. These bones are strong enough to support the weight of your upper body. Certain injuries, however, can damage one or more of the vertebrae and cause them to collapse. A collapsed bone in your spine is known as a compression fracture.    Causes of compression fracture  Many compression fractures result from osteoporosis. This disease thins your bones so they can't withstand normal pressure. Trauma from a car accident or hard fall can fracture even healthy vertebrae. In rare cases, vertebrae may fracture for unknown reasons.  When to go to the Emergency Room (ER)  Call 911 if you've been in an accident or had a fall and have neck or back pain, especially when pain occurs with any of these symptoms:    Loss of control over your bowels or bladder    Numbness or weakness    High fever    Unexplained back pain in a person with cancer  What to expect in the ER  A healthcare provider will ask about your medical history and examine you. In some cases, you may have X-rays. You also may have other tests, such as computed tomography (CT) or magnetic resonance imaging (MRI). These tests can provide detailed images of your bones and spinal cord.  Treatment  Treatment will depend on the type and cause of the fracture. You will be given medicine for pain. Severe fractures or those that cause nerve problems may need surgery. Many compression fractures mend on their own.  Follow-up  As you improve, you may be given exercises to strengthen your bones. If you have osteoporosis, your healthcare provider may prescribe a medicine to treat it. Sometimes you may have pain even after the bone has healed. In that case, your healthcare provider will discuss your choices with you.    [phone] [employer]. [address] [place] [address], [place] [address]. All  rights reserved. This information is not intended as a substitute for professional medical care. Always follow your healthcare professional's instructions.

## 2017-03-17 NOTE — PLAN OF CARE
Problem: Discharge Planning  Goal: Discharge Planning (Adult, OB, Behavioral, Peds)  Outcome: Improving  ROOM # 222     Living Situation (if not independent, order SW consult): Lives with SON, granddaughter takes care of her.  Facility name:     Activity level at baseline: SBA  Activity level on admit: A-1     Is patient a falls risk? Yes  Reason for falls risk:  Mobility/cognition  Falls armband on? YES  Within Arm's Reach? Yes   Bed alarm turned on?   YES  Personal alarm in place and turned on?   No, Not applicable     Patient registered to observation; given Patient Bill of Rights; given the opportunity to ask questions about observation status and their plan of care.  Patient has been oriented to the observation room, bathroom and call light is in place.     : SON

## 2017-03-17 NOTE — PLAN OF CARE
Problem: Discharge Planning  Goal: Discharge Planning (Adult, OB, Behavioral, Peds)  Outcome: Improving  PRIMARY DIAGNOSIS: Acute Traumatic Pain  OUTPATIENT/OBSERVATION GOALS TO BE MET BEFORE DISCHARGE:     1. Tolerate PO Intake: Yes  2. Cleared for discharge from consultants involved: MD has not rounded yet today  3. ADLs back to baseline?  Yes  4. Activity and level of assistance: Up with standby assistance.  5. Pain status: Improved-controlled with oral pain medications.  6. Barriers to discharge noted Yes SW working with family and patient      Patient alert and oriented to self, situation and place, not time. Patient given tylenol for pain. Up to bathroom with SBA and walker. Patient doing well.

## 2017-03-17 NOTE — H&P
CHIEF COMPLAINT:  Fall, back pain.      HISTORY OF PRESENT ILLNESS:  This is mainly obtained from her daughter who was in attendance, as the patient is quite demented.  Analia Higgins is an 87-year-old white female who was found down by her son after a fall.  The fall was unwitnessed.  It occurred on 03/14/2017.  She states that after the fall, she had some pain in her lower back.  The patient came here and was admitted on 03/14/2017 and subsequently discharged the following day.  She was given Tylenol and tramadol for pain; however, the patient was not able to take these medications, and her daughter felt that the patient was in agony, and hence brought her into the ER for further evaluation.      Over here, the patient states that her pain is fine.  She states that she has no back pain at present.  She was given 0.2 mg of IV Dilaudid.  Of note, when she came in on 03/14/2017, a CT scan of the L-spine showed a mild compression fracture of the superior endplate of the L1 vertebral body.  I am asked to admit her for further evaluation.      PAST MEDICAL HISTORY:  Obtained from the chart and is significant for:   1.  Alcoholic hepatitis.   2.  Cirrhosis of the liver.   3.  Prior history of cerebrovascular accident.   4.  Nephrolithiasis.   5.  Sciatica.      PAST SURGICAL HISTORY:   1.  Hysterectomy.   2.  Laparoscopic cholecystectomy.   3.  Bilateral cataract surgery.   4.  Left open reduction internal fixation of humerus.   5.  Right hip arthroplasty.      SOCIAL HISTORY:  She continues to smoke.  She smokes one pack a day.  She does drink one-half pint of vodka every day.  Denies any withdrawal symptoms.      FAMILY HISTORY:  Reviewed.  There is no pertinent family history.      ALLERGIES:  Codeine, peanuts.      MEDICATIONS PRIOR TO ADMISSION:   1.  Aricept.   2.  Trazodone.   3.  Diltiazem.   4.  Remeron.   5.  Aspirin.   6.  B12.      REVIEW OF SYSTEMS:  Unreliable secondary to underlying dementia.   However, she denies any chest pain, shortness of breath, fevers or chills.  Her daughter endorses that she has not been eating much, and that she has been dizzy.      PHYSICAL EXAM:   VITAL SIGNS:  Temperature is 97.9.  Pulse 77.  Blood pressure is 149/81.  Respiratory rate is 18.  O2 sat is 100% on room air.   GENERAL:  She is alert, awake, oriented to self, in no acute distress.   HEENT:  Pupils are equal, round, reactive to light.  Pharynx is edentulous.   NECK:  There is no tender anterior cervical lymphadenopathy.   LUNGS:  Clear to auscultation bilaterally.   HEART:  Regular rate, S1-S2 normal.  No murmurs or gallops.   ABDOMEN:  Soft, nontender, with good bowel sounds.   EXTREMITIES:  There is no edema.   SKIN:  There is no rash noted.   NEUROLOGIC:  She moves all of her extremities.      LABS:  Lab work obtained here included a basic metabolic panel which is grossly within normal limits.  Lactic acid is 0.9.  CRP is 27.8.  CBC with differential is grossly unremarkable.      A urinalysis did not show any evidence of a UTI.      She had the following imaging studies carried out over here yesterday:  CT of the abdomen and pelvis, with IV contrast, showed acute mild compression fracture of the superior endplate of the L1 vertebral body.  No other cause of acute pain identified in the abdomen or pelvis.  There was a 3.6 x 3.5 abdominal aortic aneurysm.  On the comparison study, the abdominal aorta measured 2.4 x 2.3.  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  A few nonobstructing calculi in both kidneys.  Colonic diverticulosis without evidence of diverticulitis.      CT of the L-spine showed acute mild compression fracture of the superior endplate of the L1 vertebral body.      Chest x-ray, 2 views, showed no convincing cardiopulmonary disease.  Hyperinflated lungs suggestive of chronic obstructive pulmonary disease.      ASSESSMENT AND PLAN:   1.  Low back pain secondary to acute  mild compression fracture of the superior endplate of the L1 vertebral body:  We will admit her under observation status.  We will place her on pain medications.   2.  Dementia:  We will monitor her.  We will have a sitter with her, as she is at high risk for falls, given her baseline confusion.  She was already seen by Neurosurgery on her previous admit; hence, I am going to hold off on Neurosurgery consultation.   3.  CODE STATUS:  DNR/DNI.  This was confirmed with her daughter who was in attendance over here.   4.  She will be admitted under observation status.         ARA RESENDIZ MD             D: 2017 23:52   T: 2017 01:51   MT: [id number]      Name:     EMILY GREY   MRN:      [id number]        Account:      [id number]   :      1929           Admitted:     [id number]      Document: [id number]       cc: Karen Weiler MD

## 2017-03-17 NOTE — PHARMACY-ADMISSION MEDICATION HISTORY
Admission medication history interview status for this patient is complete. See [facility] admission navigator for allergy information, prior to admission medications and immunization status.     Medication history interview source(s):Pt son via phone  Medication history resources (including written lists, pill bottles, clinic record):[facility] discharge list from 3/15/17  Primary pharmacy:[facility]    Changes made to PTA medication list:  Added: ----  Deleted: ----  Changed: B12 inj from q30 days to [id number] per son.    Actions taken by pharmacist (provider contacted, etc):phoned pt son as pt not aware of meds and states son sets them up for her.     Additional medication history information:None    Medication reconciliation/reorder completed by provider prior to medication history? Yes, sticky note for MD      Prior to Admission medications    Medication Sig Last Dose Taking? Auth Provider   oxyCODONE (ROXICODONE) 5 MG IR tablet Take 0.5 tablets (2.5 mg) by mouth every 4 hours as needed for pain  Yes Fernando Donato MD   ondansetron (ZOFRAN ODT) 4 MG ODT tab Take 1 tablet (4 mg) by mouth every 6 hours as needed for nausea  Yes Fernando Donato MD   sennosides (SENOKOT) 8.6 MG tablet Take 1 tablet by mouth 2 times daily  Yes Fernando Donato MD   aspirin 325 MG tablet Take 325 mg by mouth daily 3/16/2017 at am Yes Unknown, Entered By History   traMADol (ULTRAM) 50 MG tablet Take 0.5 tablets (25 mg) by mouth every 6 hours as needed for moderate to severe pain 3/16/2017 at Unknown time Yes Valery Braswell PA-C   acetaminophen (TYLENOL) 500 MG tablet Take 2 tablets (1,000 mg) by mouth 3 times daily 3/16/2017 at vomit after taking Yes Valery Braswell PA-C   menthol (ICY HOT) 5 % PADS Apply 1 patch topically every 8 hours as needed for muscle soreness did not obtain yet Yes Valery Braswell PA-C   docusate sodium (COLACE) 100 MG capsule Take 1 capsule (100 mg) by mouth 2 times daily 3/16/2017  at am Yes Valery Braswell PA-C   donepezil (ARICEPT) 5 MG tablet Take 1 tablet (5 mg) by mouth At Bedtime 3/15/2017 at pm Yes Weiler, Karen, MD   traZODone (DESYREL) 50 MG tablet Take 1 tablet (50 mg) by mouth At Bedtime 3/15/2017 at pm Yes Weiler, Karen, MD   ipratropium - albuterol 0.5 mg/2.5 mg/3 mL (DUONEB) 0.5-2.5 (3) MG/3ML nebulization Take 1 vial (3 mLs) by nebulization 4 times daily as needed no recent usage Yes Weiler, Karen, MD   diltiazem 180 MG 24 hr CD capsule Take 1 capsule (180 mg) by mouth daily Must be seen for further refills 3/15/2017 at pm Yes Weiler, Karen, MD   mirtazapine (REMERON) 15 MG tablet Take 1 tablet (15 mg) by mouth At Bedtime 3/15/2017 at pm Yes Weiler, Karen, MD   albuterol (2.5 MG/3ML) 0.083% nebulizer solution Take 1 vial (2.5 mg) by nebulization every 6 hours as needed for shortness of breath / dyspnea or wheezing no recent usage Yes Weiler, Karen, MD   cyanocobalamin (VITAMIN B12) 1000 MCG/ML injection Inject 1 mL into the muscle every 3 months  3-4 months ago Yes Unknown, Entered By History   Multiple Vitamins-Minerals (CENTROVITE) TABS Take 1 tablet by mouth daily 3/16/2017 at am Yes Quinn Tijerina MD   Nutritional Supplements (ENSURE) LIQD 2 cans of Ensure daily  Yes Weiler, Karen, MD   VITAMIN D, CHOLECALCIFEROL, PO Take 2,000 Units by mouth daily.   3/16/2017 at am Yes Unknown, Entered By History

## 2017-03-17 NOTE — CONSULTS
Care Transition Initial Assessment - RN        Spoke with: Family patient's son Matti & daughter Ines.    DATA   Active Problems:    Compression fracture       Cognitive Status: unable to assess pt sleeping.        Contact information and PCP information verified: Yes       Insurance concerns: No Insurance issues identified    ASSESSMENT  Patient currently receives the following services:  n/a        Identified issues/concerns regarding health management: Spoke with patient's son and daughter.  Son aMtti made aware that pt will be discharging home today.  He would prefer if she goes to a rehab first.  Made aware of admission cost, pt states he would have to discuss that with his family members.  Spoke with patient's daughter Ines and states that family can not afford rehab cost at this time.  She will be here today to  pt, and patient will be going home with home care services.  Verified with Matti that patient is never home alone, a family is usually there for support.  Patient given CM's name and number for support if needed.      PLAN  Financial costs for the patient include none.  Patient given options and choices for discharge Yes, made son & daughter aware of TCU admission cost.  Patient/family is agreeable to the plan?  Yes: pt will be going back home with homecare nurse, PT/OT/SW.  Patient anticipates discharging to home with son.        Patient anticipates needs for home equipment: No    Plan/Disposition: Home   Appointments: no f/u appts made at this time.  Family will set up f/u appt.         Care  (CTS) will continue to follow as needed.    Maggie Clinton RN, BSN, PHN, CTS  Care Transitions Specialist  [facility]  [phone]

## 2017-03-17 NOTE — ED NOTES
Pt here for lower back pain. Pt fall 2 days ago and was admitted for S1 fracture. Pt was just discharge this morning to son's home, taking tylenol and tramadol for pain but vomiting out medications, unable to eat or keep anything down. Too painful to stand up or ambulate. Son feels he cannot take care of pt anymore. Last took tylenol and tramadol around 1700. ABCs intact, respiration even and unlabored, now stated pain 1/10 when not moving, VS stable, will continue to monitor.

## 2017-03-18 NOTE — PROGRESS NOTES
Transition Communication Hand-off for Care Transitions to Next Level of Care Provider     Name: Analia Higgins  MRN #: [id number]  Primary Care Provider: Karen Weiler  Primary Care MD Name: Dr. Weiler  [facility] [address]  [place] [address]  Primary Care Clinic Name: [facility]  Reason for Hospitalization:  Compression fracture of L1 lumbar vertebra, closed, initial encounter (H) [S32.010A]  Admit Date/Time: 3/16/2017  8:28 PM  Discharge Date: 3/17/2017  Payor Source: Payor: MEDICA / Plan: MEDICA PRIME SOLUTION / Product Type: Indemnity /          Reason for Communication Hand-off Referral: Compression fracture    Discharge Plan: Patient discharged to home with son Matti.  Will have home care services PT/OT/SW       Concern for non-adherence with plan of care:   Y/N NO  Discharge Needs Assessment:  Needs       Most Recent Value    Equipment Currently Used at Home walker, rolling    Transportation Available family or friend will provide    # of Referrals Placed by [facility]          Already enrolled in Tele-monitoring program and name of program:  n/a  Follow-up specialty is recommended: No    Follow-up plan:  No future appointments.  Family will set up f/u appt.     Any outstanding tests or procedures:  n/a      Referrals     Future Labs/Procedures    Home care nursing referral     Comments:    RN skilled nursing visit. RN to assess vital signs and weight, pain level and activity tolerance, hydration, nutrition and bowel status and home safety.    Your provider has ordered home care nursing services. If you have not been contacted within 2 days of your discharge please call the inpatient department phone number at [phone] .    Home Care OT Referral for Hospital Discharge     Comments:    OT to eval and treat    Your provider has ordered home care - occupational therapy. If you have not been contacted within 2 days of your discharge please call the department  phone number listed on the top of this document.    Home Care PT Referral for Hospital Discharge     Comments:    PT to eval and treat    Your provider has ordered home care - physical therapy. If you have not been contacted within 2 days of your discharge please call the department phone number listed on the top of this document.            Key Recommendations:  Receive home care services and f/u up with PCP as directed.      Maggie Clinton RN, BSN, PHN, CTS  Care Transitions Specialist  [facility]    AVS/Discharge Summary is the source of truth; this is a helpful guide for improved communication of patient story

## 2017-03-19 NOTE — PROGRESS NOTES
"Home care orders, please reply with \"verbal\" ok for the following orders  RN 3w1, 2w2, 1w2, 3prn  PT/OT/SW eval and treat  HHA 1w4    Pt presenting to home care following observation stay from 3/14/17 to 3/15/17 with 2nd Observation visit on 3/17/17 to 3/18 for new L1 compression fracture and severe pain. Pt lives in an independent apartment with her Son who is her primary cargiver. He works from 12 to 7 daily so pt is usally home alone. Pt is lying on couch for soc, states pain 7/10 in lower back at worst, 2/10 after pain medications. Pt was given norco during last obs stay. Family is giving 2 tabs every 6 hours around the clock. Pt is very sleepy duing visit and fell asleep during the last 30 minutes of admission.  Pt states pain is located in lower back and wraps around her waist.  Family also reports pt has not had bm x3 days. went to pharmacy and pharmacist recommended senna extra which was 17.6mg each and family gave 2 prior to RN visit.  Pt has hyperactive bs, denies passing gas, family educated on stool softner needs and that dose given was very strong and pt likely to have cramping and diarhhea when it take effect, educated to follow discharge instructions from hosptial for ongoing bowel management and work with home care nurse. Pts appetite is poor, family states she is not eating or drinking much. Pt did drink one 8 oz glass water during home care visit and a couple sips of a smoothy. Pt is ambualting with standard walker, she requires assistance of 1 to get off the couch with is old and low to the ground and very soft.  Once up, pt was ambulating but in pain. Family has been encouraged to walk pt frequently for short distances and offer foods/fluids and make sure pt is drinking to stay hydrated.  Pt has a 1cm around sore on right cheek that started as a scratch but per family pt picks at it, area is slightly reddened around and will require ongoing assessment to prevent infection.  Pt continues to smoke " daily, family and pt denies any current in the last 4 days use of vodka, eduated on the importnace of no alcohol while taking narcotics as it can enhance the effects and will be at a very high risk for falls and respritory supression. Family verbalizes they will not give pt any alcohol at this time. Pts family is experiencing burnout from pts lack of cooperation and changing stories when MDs or home care nurse is around. Family states pt is trying to /glorify/ her situation. Family feels that pt needs TCU however they can not afford to pay privately at this time so therefore are willing to see how home care can help and then reevaluate. Son has taken the next three days off of work to be with the patient 24 hours/day.   Pt requires ongoing RN assessment for pain mitigation, medication management, respritory assessment, GI/ assessment, skin integrity assessment and coping.  PT/OT for home safety evaulation, home exercise program, balance, endurance, strengthing, bathroom/shower safety and equpment needs, falls risk pervention.  SW for long term care planning, community resource, financial assessment, caregiver support.  HHA for personal cares  POLST addressed at SOC and wishes are for DNR/DNI, family will bring to upcoming PMD apt for signature  Thank you.  Pinky Aggarwal RN, BSN  [email or web address]  [phone]

## 2017-03-20 NOTE — TELEPHONE ENCOUNTER
Controlled Substance Refill Request for Norco  Problem List Complete:  No     PROVIDER TO CONSIDER COMPLETION OF PROBLEM LIST AND OVERVIEW/CONTROLLED SUBSTANCE AGREEMENT    Last Written Prescription Date:  3/17/17  Last Fill Quantity: 30,   # refills: 0    Last Office Visit with [facility] primary care provider: 9/22/16    Future Office visit:   Next 5 appointments (look out 90 days)     Mar 24, 2017 11:40 AM CDT   Office Visit with Karen Weiler, MD   [facility] [place] ([facility])    [address]  [place] [address] [phone]                  Controlled substance agreement on file: No.     Processing:  Patient will  in clinic   checked in past 6 months?  Yes 3/2/17   Ginger Jackson, RN, BSN  [facility]3] [place]

## 2017-03-20 NOTE — TELEPHONE ENCOUNTER
Patient discharged from [facility] for Compression Fracture Of L1 Lumbar Vertebra, Closed, Initial Encounter (H), Nausea on 3/17/2017.    Please contact patient for follow up; no appointment scheduled at this time.    ER / IP visits: 0 / 0    Care Coordination: TREMAYNE Barbosa  -[facility]

## 2017-03-20 NOTE — TELEPHONE ENCOUNTER
April, RN with  Home [facility] calling. She is in need of verbal order for skilled nurse visits. 1 time a week for 3 weeks, two times per week for 2 weeks, one time per week for 1 week and 3 PRN visits.    SW, PT, OT eval and treat.    HHA one time per week for 4 weeks.    Verbal orders given for all of the above.    April verbalized understanding and agrees with plan.    Will call back if further questions or concerns.    Ginger Jackson, RN, BSN

## 2017-03-20 NOTE — TELEPHONE ENCOUNTER
Please see 3/20/17 care coordination encounter.  Ginger Jackson, RN, BSN  [facility]

## 2017-03-23 NOTE — TELEPHONE ENCOUNTER
Name of caller: Melinda  Relationship of Patient: Home Care Nurse    Reason for Call: Requesting PT orders to extend 2 times a week times 1 and one time a week time 3    Best phone number to reach pt at is: [phone]  Ok to leave a message with medical info? Yes    Pharmacy preferred (if calling for a refill): TREMAYNE Segovia  [facility]-Patient Representative

## 2017-03-23 NOTE — TELEPHONE ENCOUNTER
Spoke with Melinda and gave verbal orders to proceed with PT visits as mentioned in encounter below.    Melinda verbalized understanding and agrees with plan.    Will call back if further questions or concerns.    Ginger Jackson, RN, BSN

## 2017-03-23 NOTE — TELEPHONE ENCOUNTER
Desyrel 50MG Tablet/Mirtazapine 15MG Tablet       Last Written Prescription Date: 10/07/2016-09/09/2016  Last Fill Quantity: 90; # refills: 0/1  Last Office Visit with G, P or [facility] prescribing provider:  09/22/2016   Next 5 appointments (look out 90 days)     Mar 24, 2017 11:40 AM CDT   Office Visit with Karen Weiler, MD   [facility] ([facility])    [address] [place] [address] [phone]                   Last PHQ-9 score on record=   PHQ-9 SCORE 5/23/2011   Total Score 4       Lab Results   Component Value Date    AST 18 05/12/2015     Lab Results   Component Value Date    ALT 16 05/12/2015

## 2017-03-24 NOTE — PROGRESS NOTES
SUBJECTIVE:                                                    Analia Higgins is a 87 year old female who presents to clinic today for the following health issues:    Hospital Follow-up Visit:    Hospital/Nursing Home/IP Rehab Facility: [facility]  Date of Admission: 3/14 and again on 3/16  Date of Discharge: Initially on 3/15 and subsequently on 3/20  Reason(s) for Admission: initially for back pain secondary to recent fall and subsequently for compression of fracture of L1 lumbar vertebra            Problems taking medications regularly:  None       Medication changes since discharge: hydrocodone-acetaminophen, oxycodone Senokot,  ondansetron, and menthol pads        Problems adhering to non-medication therapy:  Needs assistance     Summary of hospitalization:  [facility] discharge summary reviewed  Diagnostic Tests/Treatments reviewed.  Follow up needed: only with PCP (reason for visit today)  Other Healthcare Providers Involved in Patient s Care:         None  Update since discharge: worsenedPatricia Helms who has a history significant for early cirrhosis secondary to alcohol, previous CVA but otherwise independent, sciatica, COPD, and tobacco with ETOH abuse presents to clinic today for hospital follow up after 2 recent visits to [facility] after fall resulting in back injury. She was first seen at [facility] on 3/14 for back pain after recent fall. Imaging at this time included: a CT of the abdomen and pelvis, head CT, and CT of the L-spine were only significant for an acute L1 appearing superior endplate mild compression fracture. She was admitted for observation and pain management after which she was discharged to home on 3/15. Patient then returned to [facility] on 3/16 for nausea as side effect from pain medication and resulting not well controlled pain management. She was admitted for observation and pain management this time until 3/20. Today she reports that she is having more  pain.  Transfers are difficult.  Difficult going to the bathroom.     Family is asking about Prednisone. Patient has had remarkable results for pain management as well as energy level when she has been on short courses of prednisone in the past. The family is concerned because she appears to be in some much pain.  She is not eating. She has not had any alcohol to drink in over a week. She is not smoking. Family is concerned about her oral intake.She seems to rest okay but she often moans out in pain. She denies any fevers. Family does not want to be too aggressive but want her to be comfortable. They're having problems with transfers and resources at home. The patient has been sleeping on the couch because she is having a difficult time getting into bed. The family does not want her to get her transitional care possible. Financially it is very difficult. The patient is also more comfortable at home.    Post Discharge Medication Reconciliation: discharge medications reconciled and changed, per note/orders (see AVS).  Plan of care communicated with patient     Coding guidelines for this visit:  Type of Medical   Decision Making Face-to-Face Visit       within 7 Days of discharge Face-to-Face Visit        within 14 days of discharge   Moderate Complexity [id number]   High Complexity [id number]            Problem list and histories reviewed & adjusted, as indicated.  Additional history: as documented    Patient Active Problem List   Diagnosis     Alcoholic cirrhosis of liver (H)     Alcohol abuse     Increased MCV     CARDIOVASCULAR SCREENING; LDL GOAL LESS THAN 130     Advanced directives, counseling/discussion     Fracture of left hip (H)     Tobacco abuse     Generalized weakness     Open fracture of part of upper end of humerus     COPD (chronic obstructive pulmonary disease) (H)     Anxiety attack     Cerebral infarction (H)     Carotid stenosis     Syncope     New onset atrial fibrillation (H)     Acute  gastroenteritis     Health Care Home     Chronic pain syndrome     Atrial fibrillation (H)     Atrial fibrillation with RVR (H)     Chronic obstructive pulmonary disease, unspecified COPD type (H)     Vitamin B12 deficiency (non anemic)     Need for prophylactic vaccination with tetanus-diphtheria (TD)     Fall, initial encounter     Compression fracture of L1 lumbar vertebra, closed, initial encounter (H)     Past Surgical History:   Procedure Laterality Date     ARTHROPLASTY HIP  8/27/2012    Procedure: ARTHROPLASTY HIP;  RIGHT HIP BIPOLAR (BRENT);  Surgeon: Alessio Finley MD;  Location: [facility] OR     C VAGINAL HYSTERECTOMY  1971    Hysterectomy, Vaginal, with cystocele repair     CATARACT IOL, RT/LT  8/06    Bilateral - Dr Ronquillo     HC LAP,CHOLECYSTECTOMY/GRAPH  7/04     OPEN REDUCTION INTERNAL FIXATION ACETABULUM  april 2011    left hip     OPEN REDUCTION INTERNAL FIXATION HUMERUS PROXIMAL  1/9/2012    Procedure:OPEN REDUCTION INTERNAL FIXATION HUMERUS PROXIMAL; OPEN REDUCTION INTERNAL FIXATION HUMERUS PROXIMAL ; Surgeon:JEN MCCRAY; Location: OR       Social History   Substance Use Topics     Smoking status: Current Every Day Smoker     Packs/day: 1.00     Years: 50.00     Types: Cigarettes     Smokeless tobacco: Never Used      Comment: STARTED SMOKING IN 1947     Alcohol use 21.0 oz/week      Comment: 3 or 4 drinks of Vodka q day for  60 years - as of 05/062013 about 2 ozs of 80 vodka daily (son measures)     Family History   Problem Relation Age of Onset     C.A.D. Father      late 60's     Breast Cancer Sister      Asthma Son      DIABETES Maternal Aunt      DIABETES Maternal Aunt          Current Outpatient Prescriptions   Medication Sig Dispense Refill     HYDROcodone-acetaminophen (NORCO) 5-325 MG per tablet Take 1-2 tablets by mouth every 6 hours as needed for moderate to severe pain 30 tablet 0     acetaminophen (TYLENOL) 500 MG tablet Take 1 tablet (500 mg) by mouth 3 times daily 1 Bottle 0      ondansetron (ZOFRAN ODT) 4 MG ODT tab Take 1 tablet (4 mg) by mouth every 6 hours as needed for nausea 20 tablet 0     menthol (ICY HOT) 5 % PADS Apply 1 patch topically every 8 hours as needed for muscle soreness 5 patch 0     oxyCODONE (ROXICODONE) 5 MG IR tablet Take 0.5 tablets (2.5 mg) by mouth every 4 hours as needed for pain 20 tablet 0     sennosides (SENOKOT) 8.6 MG tablet Take 1 tablet by mouth 2 times daily 30 tablet 1     aspirin 325 MG tablet Take 325 mg by mouth daily       docusate sodium (COLACE) 100 MG capsule Take 1 capsule (100 mg) by mouth 2 times daily 30 capsule 0     donepezil (ARICEPT) 5 MG tablet Take 1 tablet (5 mg) by mouth At Bedtime 90 tablet 2     traZODone (DESYREL) 50 MG tablet Take 1 tablet (50 mg) by mouth At Bedtime 90 tablet 0     order for DME Equipment being ordered: Nebulizer 1 Device 0     ipratropium - albuterol 0.5 mg/2.5 mg/3 mL (DUONEB) 0.5-2.5 (3) MG/3ML nebulization Take 1 vial (3 mLs) by nebulization 4 times daily as needed 1 Box 4     diltiazem 180 MG 24 hr CD capsule Take 1 capsule (180 mg) by mouth daily Must be seen for further refills 90 capsule 2     mirtazapine (REMERON) 15 MG tablet Take 1 tablet (15 mg) by mouth At Bedtime 90 tablet 1     albuterol (2.5 MG/3ML) 0.083% nebulizer solution Take 1 vial (2.5 mg) by nebulization every 6 hours as needed for shortness of breath / dyspnea or wheezing 75 mL 0     cyanocobalamin (VITAMIN B12) 1000 MCG/ML injection Inject 1 mL into the muscle every 3 months        Multiple Vitamins-Minerals (CENTROVITE) TABS Take 1 tablet by mouth daily 90 tablet 1     Nutritional Supplements (ENSURE) LIQD 2 cans of Ensure daily 60 Can 11     VITAMIN D, CHOLECALCIFEROL, PO Take 2,000 Units by mouth daily.         ORDER FOR DME        Allergies   Allergen Reactions     Codeine      vomiting     Peanuts [Nuts]        Reviewed and updated as needed this visit by clinical staff       Reviewed and updated as needed this visit by Provider      "    ROS:  Constitutional, HEENT, cardiovascular, pulmonary, gi and gu systems are negative, except as otherwise noted.    OBJECTIVE:                                                    /58  Pulse 114  Temp 98.1  F (36.7  C) (Oral)  Ht 5' 2\" (1.575 m)  Wt 97 lb (44 kg)  SpO2 95%  BMI 17.74 kg/m2  Body mass index is 17.74 kg/(m^2).     GENERAL: Frail, sitting in wheelchair. Not answering questions.Intermittently closing her eyes.  Skin; abrasion on the right side of the face.  EYES: Eyes grossly normal to inspection, PERRL and conjunctivae and sclerae normal  HENT: ear canals and TM's normal, nose and mouth without ulcers or lesions  NECK: no adenopathy, no asymmetry, masses, or scars and thyroid normal to palpation  RESP: lungs clear to auscultation - no rales, rhonchi or wheezes  CV: regular rate and rhythm, normal S1 S2, no S3 or S4, no murmur, click or rub, no peripheral edema and peripheral pulses strong  ABDOMEN: soft, nontender, no hepatosplenomegaly, no masses and bowel sounds normal  MS: no gross musculoskeletal defects noted, no edema    Diagnostic Test Results:  No results found for this or any previous visit (from the past 24 hour(s)).     ASSESSMENT/PLAN:                                                    (S32.010A) Compression fracture of L1 lumbar vertebra, closed, initial encounter (H)  (primary encounter diagnosis)  Comment: Patient's pain has not been under good control. They are having a difficult time with transfers. She's not been able to sleep well. She's been taking the Norco. Patient's family wants to now if there is potentially something a little bit longer acting to try. We'll try OxyContin with oxycodone for breakthrough. Home health is coming out to the house.  Physical therapy is going to be starting next week. Family does not think they want anything aggressive done as far as a procedure. She is wearing her brace.  Patient has done remarkably well on prednisone in the past. " Discussed risk of Possible GI upset as well as elevated blood sugars.We have to weigh at this point her quality of life versus side effects. The family would like to try a short course to see if this will help with her pain.  Plan: oxyCODONE (OXYCONTIN) 10 MG 12 hr tablet,         oxyCODONE (ROXICODONE) 5 MG IR tablet,         predniSONE (DELTASONE) 20 MG tablet, HOSPICE         REFERRAL          (J44.9) Chronic obstructive pulmonary disease, unspecified COPD type (H)  Comment: Stable. Has not been using her nebulizers Respiratory status is stable.    (I48.91) Atrial fibrillation, unspecified type (H)  Comment: Stable. No chest pain.      (R63.8) Poor fluid intake  Comment: Patient is not eating well. Her family feels like it is due to her pain. Encouraged her to push fluids. The family does not want any aggressive tube feeds done    The family is very concerned about her pain and her ability to care for her at home. Patient has had considerable weight loss. She is not eating well.With this recent fracture, and concerned about her bouncing back from it.  The family just wants her to be comfortable and at home. Brought up the option of hospice which they were very agreeable to. This also might help us get better control of her pain. They also need some resources to keep her stable at home.  The patient does not want to back to the hospital if at all possible. Patient is DNR/DNI and this was confirmed with the family today. Referral was done.    Karen Weiler, MD

## 2017-03-24 NOTE — MR AVS SNAPSHOT
After Visit Summary   3/24/2017    Analia Higgins    MRN: [id number]           Patient Information     Date Of Birth          5/26/1929        Visit Information        Provider Department      3/24/2017 11:40 AM Weiler, Karen, MD [facility]        Today's Diagnoses     Compression fracture of L1 lumbar vertebra, closed, initial encounter (H)    -  1    Chronic obstructive pulmonary disease, unspecified COPD type (H)        Atrial fibrillation, unspecified type (H)        Poor fluid intake           Follow-ups after your visit        Additional Services     HOSPICE REFERRAL       **Order classes of: [facility], [facility] and [facility] will route to the Home Care and Hospice Referral Pool.  Home Care or Hospice will then contact the patient to schedule their appointment.**    If you do not hear from Home Care and Hospice, or you would like to call to schedule, please call the referring place of service that your provider has listed below.  ______________________________________________________________________    Your provider has referred you to: [facility]: [facility] [phone]   [email or web address]    Extended Emergency Contact Information  Primary Emergency Contact: Matti Higgins  Address: [address] [address] [place]  Home Phone: [phone]  Work Phone: none  Mobile Phone: [phone]  Relation: Son  Secondary Emergency Contact: Ines Pinzon  Address: [address] [place] [address] [place]  Home Phone: [phone]  Work Phone: none  Mobile Phone: [phone]  Relation: Daughter    Patient Anticipated Discharge Date: Already discharged   RN, PT, HHA to begin 24 - 48 hours after discharge.  PLEASE EVALUATE AND TREAT (Evaluation timeline is 24 - 48 hrs. Please call if there is need for a variance to this timeline).    REASON  FOR REFERRAL: Home Based Palliative Care ([place] - terminal disease still being treated) Diagnosis: Back Fracture    ADDITIONAL SERVICES NEEDED: A    OTHER PERTINENT INFORMATION: Patient was last seen by provider on March 24, 2017   for Hospital follow up, pain management.    Current Outpatient Prescriptions:  oxyCODONE (OXYCONTIN) 10 MG 12 hr tablet, Take 1 tablet (10 mg) by mouth every 12 hours maximum 2 tablet(s) per day, Disp: 60 tablet, Rfl: 0  oxyCODONE (ROXICODONE) 5 MG IR tablet, Take 1 tablet (5 mg) by mouth every 4 hours as needed for pain maximum 6 tablet(s) per day, Disp: 60 tablet, Rfl: 0  predniSONE (DELTASONE) 20 MG tablet, Take 1 tablet (20 mg) by mouth daily, Disp: 5 tablet, Rfl: 0  HYDROcodone-acetaminophen (NORCO) 5-325 MG per tablet, Take 1-2 tablets by mouth every 6 hours as needed for moderate to severe pain, Disp: 30 tablet, Rfl: 0  acetaminophen (TYLENOL) 500 MG tablet, Take 1 tablet (500 mg) by mouth 3 times daily, Disp: 1 Bottle, Rfl: 0  ondansetron (ZOFRAN ODT) 4 MG ODT tab, Take 1 tablet (4 mg) by mouth every 6 hours as needed for nausea, Disp: 20 tablet, Rfl: 0  menthol (ICY HOT) 5 % PADS, Apply 1 patch topically every 8 hours as needed for muscle soreness, Disp: 5 patch, Rfl: 0  oxyCODONE (ROXICODONE) 5 MG IR tablet, Take 0.5 tablets (2.5 mg) by mouth every 4 hours as needed for pain, Disp: 20 tablet, Rfl: 0  sennosides (SENOKOT) 8.6 MG tablet, Take 1 tablet by mouth 2 times daily, Disp: 30 tablet, Rfl: 1  aspirin 325 MG tablet, Take 325 mg by mouth daily, Disp: , Rfl:   docusate sodium (COLACE) 100 MG capsule, Take 1 capsule (100 mg) by mouth 2 times daily, Disp: 30 capsule, Rfl: 0  donepezil (ARICEPT) 5 MG tablet, Take 1 tablet (5 mg) by mouth At Bedtime, Disp: 90 tablet, Rfl: 2  traZODone (DESYREL) 50 MG tablet, Take 1 tablet (50 mg) by mouth At Bedtime, Disp: 90 tablet, Rfl: 0  order for DME, Equipment being ordered: Nebulizer, Disp: 1 Device, Rfl: 0  ipratropium - albuterol 0.5  mg/2.5 mg/3 mL (DUONEB) 0.5-2.5 (3) MG/3ML nebulization, Take 1 vial (3 mLs) by nebulization 4 times daily as needed, Disp: 1 Box, Rfl: 4  diltiazem 180 MG 24 hr CD capsule, Take 1 capsule (180 mg) by mouth daily Must be seen for further refills, Disp: 90 capsule, Rfl: 2  mirtazapine (REMERON) 15 MG tablet, Take 1 tablet (15 mg) by mouth At Bedtime, Disp: 90 tablet, Rfl: 1  albuterol (2.5 MG/3ML) 0.083% nebulizer solution, Take 1 vial (2.5 mg) by nebulization every 6 hours as needed for shortness of breath / dyspnea or wheezing, Disp: 75 mL, Rfl: 0  cyanocobalamin (VITAMIN B12) 1000 MCG/ML injection, Inject 1 mL into the muscle every 3 months , Disp: , Rfl:   Multiple Vitamins-Minerals (CENTROVITE) TABS, Take 1 tablet by mouth daily, Disp: 90 tablet, Rfl: 1  Nutritional Supplements (ENSURE) LIQD, 2 cans of Ensure daily, Disp: 60 Can, Rfl: 11  VITAMIN D, CHOLECALCIFEROL, PO, Take 2,000 Units by mouth daily.  , Disp: , Rfl:   ORDER FOR DME, , Disp: , Rfl:       Patient Active Problem List:     Alcoholic cirrhosis of liver (H)     Alcohol abuse     Increased MCV     CARDIOVASCULAR SCREENING; LDL GOAL LESS THAN 130     Advanced directives, counseling/discussion     Fracture of left hip (H)     Tobacco abuse     Generalized weakness     Open fracture of part of upper end of humerus     COPD (chronic obstructive pulmonary disease) (H)     Anxiety attack     Cerebral infarction (H)     Carotid stenosis     Syncope     New onset atrial fibrillation (H)     Acute gastroenteritis     Health Care Home     Chronic pain syndrome     Atrial fibrillation (H)     Atrial fibrillation with RVR (H)     Chronic obstructive pulmonary disease, unspecified COPD type (H)     Vitamin B12 deficiency (non anemic)     Need for prophylactic vaccination with tetanus-diphtheria (TD)     Fall, initial encounter     Compression fracture of L1 lumbar vertebra, closed, initial encounter (H)      Documentation of Face to Face and Certification for  Home Health Services    I certify that patient, Analia Higgins is under my care and that I, or a Nurse Practitioner or Physician's Assistant working with me, had a face-to-face encounter that meets the physician face-to-face encounter requirements with this patient on: 3/24/2017.    This encounter with the patient was in whole, or in part, for the following medical condition, which is the primary reason for Home Health Care: .    I certify that, based on my findings, the following services are medically necessary Home Health Services: Nursing    My clinical findings support the need for the above services because: Nurse is needed: To assess Patient after changes in medications or other medical regimen..    Further, I certify that my clinical findings support that this patient is homebound (i.e. absences from home require considerable and taxing effort and are for medical reasons or [facility] services or infrequently or of short duration when for other reasons) because: Leaving home is medically contraindicated for the following reason(s): Side effects of increased pain medication including: dizziness..    Based on the above findings, I certify that this patient is confined to the home and needs intermittent skilled nursing care, physical therapy and/or speech therapy.  The patient is under my care, and I have initiated the establishment of the plan of care.  This patient will be followed by a physician who will periodically review the plan of care.    Physician/Provider to provide follow up care: Weiler, Karen Responsible [employer] certified Physician at time of discharge: Karen Weiler, MD      Please be aware that coverage of these services is subject to the terms and limitations of your health insurance plan.  Call member services at your health plan with any benefit or coverage questions.                  Who to contact     If you have questions or need follow up information about today's clinic visit or  "your schedule please contact [facility] [place] directly at [phone].  Normal or non-critical lab and imaging results will be communicated to you by MyChart, letter or phone within 4 business days after the clinic has received the results. If you do not hear from us within 7 days, please contact the clinic through [employer] or phone. If you have a critical or abnormal lab result, we will notify you by phone as soon as possible.  Submit refill requests through [employer] or call your pharmacy and they will forward the refill request to us. Please allow 3 business days for your refill to be completed.          Additional Information About Your Visit        [employer] Information     [employer] gives you secure access to your electronic health record. If you see a primary care provider, you can also send messages to your care team and make appointments. If you have questions, please call your primary care clinic.  If you do not have a primary care provider, please call [phone] and they will assist you.        Care EveryWhere ID     This is your Care EveryWhere ID. This could be used by other organizations to access your [facility] medical records  [id number]        Your Vitals Were     Pulse Temperature Height Pulse Oximetry BMI (Body Mass Index)       114 98.1  F (36.7  C) (Oral) 5' 2\" (1.575 m) 95% 17.74 kg/m2        Blood Pressure from Last 3 Encounters:   03/24/17 130/58   03/17/17 141/69   03/15/17 169/75    Weight from Last 3 Encounters:   03/24/17 97 lb (44 kg)   03/17/17 97 lb (44 kg)   09/22/16 99 lb (44.9 kg)              We Performed the Following     HOSPICE REFERRAL          Today's Medication Changes          These changes are accurate as of: 3/24/17  7:52 PM.  If you have any questions, ask your nurse or doctor.               Start taking these medicines.        Dose/Directions    predniSONE 20 MG tablet   Commonly known as:  DELTASONE   Used for:  Compression fracture of L1 lumbar vertebra, closed, " initial encounter (H)   Started by:  Weiler, Karen, MD        Dose:  20 mg   Take 1 tablet (20 mg) by mouth daily   Quantity:  5 tablet   Refills:  0         These medicines have changed or have updated prescriptions.        Dose/Directions    * oxyCODONE 5 MG IR tablet   Commonly known as:  ROXICODONE   This may have changed:  Another medication with the same name was added. Make sure you understand how and when to take each.        Dose:  2.5 mg   Take 0.5 tablets (2.5 mg) by mouth every 4 hours as needed for pain   Quantity:  20 tablet   Refills:  0       * oxyCODONE 10 MG 12 hr tablet   Commonly known as:  OxyCONTIN   This may have changed:  You were already taking a medication with the same name, and this prescription was added. Make sure you understand how and when to take each.   Used for:  Compression fracture of L1 lumbar vertebra, closed, initial encounter (H)   Changed by:  Weiler, Karen, MD        Dose:  10 mg   Take 1 tablet (10 mg) by mouth every 12 hours maximum 2 tablet(s) per day   Quantity:  60 tablet   Refills:  0       * oxyCODONE 5 MG IR tablet   Commonly known as:  ROXICODONE   This may have changed:  You were already taking a medication with the same name, and this prescription was added. Make sure you understand how and when to take each.   Used for:  Compression fracture of L1 lumbar vertebra, closed, initial encounter (H)   Changed by:  Weiler, Karen, MD        Dose:  5 mg   Take 1 tablet (5 mg) by mouth every 4 hours as needed for pain maximum 6 tablet(s) per day   Quantity:  60 tablet   Refills:  0       * Notice:  This list has 3 medication(s) that are the same as other medications prescribed for you. Read the directions carefully, and ask your doctor or other care provider to review them with you.         Where to get your medicines      Some of these will need a paper prescription and others can be bought over the counter.  Ask your nurse if you have questions.     Bring a paper  prescription for each of these medications     oxyCODONE 10 MG 12 hr tablet    oxyCODONE 5 MG IR tablet    predniSONE 20 MG tablet                Primary Care Provider Office Phone # Fax #    Karen Weiler, [phone]       [facility] [address]  [place] [address]        Thank you!     Thank you for choosing [facility]  for your care. Our goal is always to provide you with excellent care. Hearing back from our patients is one way we can continue to improve our services. Please take a few minutes to complete the written survey that you may receive in the mail after your visit with us. Thank you!             Your Updated Medication List - Protect others around you: Learn how to safely use, store and throw away your medicines at www.disposemymeds.org.          This list is accurate as of: 3/24/17  7:52 PM.  Always use your most recent med list.                   Brand Name Dispense Instructions for use    acetaminophen 500 MG tablet    TYLENOL    1 Bottle    Take 1 tablet (500 mg) by mouth 3 times daily       albuterol (2.5 MG/3ML) 0.083% neb solution     75 mL    Take 1 vial (2.5 mg) by nebulization every 6 hours as needed for shortness of breath / dyspnea or wheezing       aspirin 325 MG tablet      Take 325 mg by mouth daily       CENTROVITE Tabs     90 tablet    Take 1 tablet by mouth daily       cyanocobalamin 1000 MCG/ML injection    VITAMIN B12     Inject 1 mL into the muscle every 3 months       diltiazem 180 MG 24 hr capsule     90 capsule    Take 1 capsule (180 mg) by mouth daily Must be seen for further refills       docusate sodium 100 MG capsule    COLACE    30 capsule    Take 1 capsule (100 mg) by mouth 2 times daily       donepezil 5 MG tablet    ARICEPT    90 tablet    Take 1 tablet (5 mg) by mouth At Bedtime       ENSURE Liqd     60 Can    2 cans of Ensure daily       HYDROcodone-acetaminophen 5-325 MG per tablet    NORCO    30 tablet    Take 1-2 tablets  by mouth every 6 hours as needed for moderate to severe pain       ipratropium - albuterol 0.5 mg/2.5 mg/3 mL 0.5-2.5 (3) MG/3ML neb solution    DUONEB    1 Box    Take 1 vial (3 mLs) by nebulization 4 times daily as needed       menthol 5 % Pads    ICY HOT    5 patch    Apply 1 patch topically every 8 hours as needed for muscle soreness       mirtazapine 15 MG tablet    REMERON    90 tablet    Take 1 tablet (15 mg) by mouth At Bedtime       ondansetron 4 MG ODT tab    ZOFRAN ODT    20 tablet    Take 1 tablet (4 mg) by mouth every 6 hours as needed for nausea       order for DME          order for DME     1 Device    Equipment being ordered: Nebulizer       * oxyCODONE 5 MG IR tablet    ROXICODONE    20 tablet    Take 0.5 tablets (2.5 mg) by mouth every 4 hours as needed for pain       * oxyCODONE 10 MG 12 hr tablet    OxyCONTIN    60 tablet    Take 1 tablet (10 mg) by mouth every 12 hours maximum 2 tablet(s) per day       * oxyCODONE 5 MG IR tablet    ROXICODONE    60 tablet    Take 1 tablet (5 mg) by mouth every 4 hours as needed for pain maximum 6 tablet(s) per day       predniSONE 20 MG tablet    DELTASONE    5 tablet    Take 1 tablet (20 mg) by mouth daily       sennosides 8.6 MG tablet    SENOKOT    30 tablet    Take 1 tablet by mouth 2 times daily       traZODone 50 MG tablet    DESYREL    90 tablet    Take 1 tablet (50 mg) by mouth At Bedtime       VITAMIN D (CHOLECALCIFEROL) PO      Take 2,000 Units by mouth daily.       * Notice:  This list has 3 medication(s) that are the same as other medications prescribed for you. Read the directions carefully, and ask your doctor or other care provider to review them with you.

## 2017-03-24 NOTE — TELEPHONE ENCOUNTER
Patient will be seen today by Dr. Weiler for already scheduled appointment. Medication refill to be reviewed at this encounter. Dahiana Shepherd R.N.

## 2017-03-24 NOTE — TELEPHONE ENCOUNTER
Patients son called to just state that she is doing 100% better on the medication and wanted us to notate that. And was also needing an update on her RX's that should be sent to the pharmacy.    Ginger Patel [employer] [facility]-Patient Representative

## 2017-03-24 NOTE — NURSING NOTE
"Chief Complaint   Patient presents with     Hospital F/U       Initial /58  Pulse 114  Temp 98.1  F (36.7  C) (Oral)  Ht 5' 2\" (1.575 m)  Wt 97 lb (44 kg)  SpO2 95%  BMI 17.74 kg/m2 Estimated body mass index is 17.74 kg/(m^2) as calculated from the following:    Height as of this encounter: 5' 2\" (1.575 m).    Weight as of this encounter: 97 lb (44 kg).  Medication Reconciliation: complete   Yen Victor Medical Assistant      "

## 2017-03-27 NOTE — TELEPHONE ENCOUNTER
I called son Matti back as I was unsure from below message if a refill was needed or not. He said no refills needed, everything is great, and they are set. No further questions. Will close encounter. Dahiana Shepherd R.N.

## 2017-03-28 NOTE — TELEPHONE ENCOUNTER
Date Forms was received: March 28, 2017    Forms received by: Fax    Last office visit: 3-24-17    Purpose of Form:  Home Health Orders    When the form is due:  asap    How the form needs to be returned for patient:  Fax    Form currently placed  KW forms file  Iliana Mary Kay HERNANDEZ

## 2017-03-29 NOTE — PROGRESS NOTES
Clinic Care Coordination Contact  Care Team Conversations    Huddle with PCP on 03/24 with request from her to reach out to pt/son Matti to discuss hospice.  Placed call today, spoke with Matti.  [facility] nurse Hilary was there at time of call seeing pt. Matti reports that pt has appointment this Friday to open her to hospice.  Discussed hospice with Bill, he seems to have a fair understanding of what to expect.  Inquired on pt's pain, he stated that pt had a good day yesterday, but is struggling some today. Pt reports pain at a 7 today. He has given her prn medications, last dose at 9 am, so not due until 1 pm.  Encouraged Matti to discuss pt pain control with the home care RN and have her call to request something different if she recommends it.  Matti stated that he would do so.  Will plan to follow up with Matti in the next 1-2 weeks to make sure pt's pain is well controlled and has transitioned to hospice.  Matti in agreement with plan.

## 2017-04-03 NOTE — PROGRESS NOTES
Clinic Care Coordination Contact  Care Team Conversations    Received call from LIO Triana with  Hospice informing writer that pt was admitted to service on 03/31. Will plan to check in with son Matti in the next 1-2 weeks.

## 2017-04-05 NOTE — TELEPHONE ENCOUNTER
Date Received: 04/04/17    Sent by: fax    For: orders     Last Office Visit: 03/24/17    Return by fax      Signed and faxed   Yen Victor Medical Assistant

## 2017-04-11 NOTE — TELEPHONE ENCOUNTER
Date Forms was received: April 11, 2017    Forms received by: Fax    Last office visit: 3-24-17    Purpose of Form:  Hospice certification    When the form is due:  asap    How the form needs to be returned for patient:  Fax [phone]    Form currently placed  K[facility] forms file.  Iliana Muñiz MA

## 2017-04-17 NOTE — PROGRESS NOTES
Clinic Care Coordination Contact  Care Team Conversations    Placed call to pt, she reports that she is doing well, hasn't seen any nurses but stated that she doesn't need any extra support right now.  Placed call to pt's son Matti, he reports that things are going well with hospice, no concerns at this time.  Encouraged Matti to call writer if any future needs arise.  Will close to clinic CC at this time.

## 2017-06-21 NOTE — TELEPHONE ENCOUNTER
diltiazem 180 MG 24 hr CD capsule         Last Written Prescription Date: 9/9/2016  Last Fill Quantity: 90 capsule, # refills: 2    Last Office Visit with G, P or [facility] prescribing provider:  3/24/2017   Future Office Visit:      BP Readings from Last 3 Encounters:   03/24/17 130/58   03/17/17 141/69   03/15/17 169/75     Lab Results   Component Value Date    ALT 16 05/12/2015     Lab Results   Component Value Date    CHOL 175 10/03/2012     Lab Results   Component Value Date    HDL 54 10/03/2012     Lab Results   Component Value Date     10/03/2012     Lab Results   Component Value Date    TRIG 85 10/03/2012     Lab Results   Component Value Date    CHOLHDLRATIO 3.2 10/03/2012

## 2017-06-26 NOTE — TELEPHONE ENCOUNTER
Nikki from [facility] calling for verbal ok to continue hospice  Given  Gypsy Lira RN- Triage [employer]

## 2017-07-03 NOTE — TELEPHONE ENCOUNTER
Date Forms was received: July 3, 2017    Forms received by: Fax    Last office visit: 3-24-17    Purpose of Form:  Hospice plan of care    When the form is due:  asap    How the form needs to be returned for patient:  Fax    Form currently placed  KW forms file  Iliana Mary Kay HERNANDEZ

## 2017-09-13 NOTE — TELEPHONE ENCOUNTER
I spoke with LIO Jordan [facility]. Requesting continuation of Hospice orders--approved per protocol. Dahiana Shepherd R.N.

## 2017-09-13 NOTE — TELEPHONE ENCOUNTER
Name of caller: Nikki  Relationship of Patient: Hospice    Reason for Call: Needs orders to continue Hospice    Best phone number to reach pt at is: [phone]  Ok to leave a message with medical info? Yes    Pharmacy preferred (if calling for a refill): [facility]-Patient Representative

## 2017-09-26 NOTE — TELEPHONE ENCOUNTER
Prescription approved per [facility] Refill Protocol.  Ginger Jackson, RN, BSN  [facility]

## 2017-09-26 NOTE — TELEPHONE ENCOUNTER
mirtazapine (REMERON) 15 MG tablet       Last Written Prescription Date: 3/24/2017  Last Fill Quantity: 90 tablet; # refills: 1  Last Office Visit with [facility], [facility] or [facility] prescribing provider:  3/24/2017        Last PHQ-9 score on record=   PHQ-9 SCORE 5/23/2011   Total Score 4       Lab Results   Component Value Date    AST 18 05/12/2015     Lab Results   Component Value Date    ALT 16 05/12/2015

## 2017-10-16 NOTE — PROGRESS NOTES
Clinic Care Coordination Contact  Care Team Conversations    Received call from KEILA Jordan with [facility] informing writer that pt  this weekend.  Noted that MD has been made aware.  No further action needed at this time.

## 2017-10-19 ENCOUNTER — TELEPHONE (OUTPATIENT)
Dept: FAMILY MEDICINE | Facility: CLINIC | Age: 82
End: 2017-10-19

## 2017-10-19 NOTE — TELEPHONE ENCOUNTER
Below noted. Report was received from  Hospice last week - MD PENNY is aware.  Ginger Jackson RN, BSN  [facility]

## 2017-10-19 NOTE — TELEPHONE ENCOUNTER
Pt passed away last Friday.     Daughter calling to let us know.     They appreciated the care that KW provided for the pt.    Noted a call was put in last week regarding this as well.    Janice Kamara RN  [facility]

## 2017-10-20 ENCOUNTER — DOCUMENTATION ONLY (OUTPATIENT)
Dept: OTHER | Facility: CLINIC | Age: 82
End: 2017-10-20

## 2017-10-20 DIAGNOSIS — Z71.89 ACP (ADVANCE CARE PLANNING): Chronic | ICD-10-CM

## 2017-11-01 ENCOUNTER — TELEPHONE (OUTPATIENT)
Dept: FAMILY MEDICINE | Facility: CLINIC | Age: 82
End: 2017-11-01

## 2024-05-02 NOTE — TELEPHONE ENCOUNTER
Date Forms was received: July 7, 2017    Forms received by: Fax    Last office visit: 3/24/2017    Purpose of Form:  Home Care Orders [facility]    When the form is due:  ASAP    How the form needs to be returned for patient:  Fax    Form currently placed  KW inbox      
5